# Patient Record
Sex: FEMALE | Race: OTHER | Employment: PART TIME | URBAN - METROPOLITAN AREA
[De-identification: names, ages, dates, MRNs, and addresses within clinical notes are randomized per-mention and may not be internally consistent; named-entity substitution may affect disease eponyms.]

---

## 2017-11-07 NOTE — ED PROVIDER NOTES
Patient Seen in: [facility] Emergency Department In [place]    History   Patient presents with:  Nausea/Vomiting/Diarrhea (gastrointestinal)  Abdomen/Flank Pain (GI/)    Stated Complaint: withdrawl    HPI    58-year-old female presents reporting symptom rigidity   Lungs: good air exchange and clear   Heart: regular rate rhythm and no murmur   Abdomen: Generalized tenderness on palpation. Normal bowel sounds. No abdominal masses.   No peritoneal signs   Extremities: no edema, normal peripheral pulses   Ne with methadone withdrawal.  Urine appears concentrated at the bedside. Elevated spec grav. IV fluids in progress. Will check labs.   She has had no improvement in nausea and vomiting with Zofran at home or in previous ER visit at an outside hospital.  Matilde Reyna

## 2017-11-07 NOTE — ED INITIAL ASSESSMENT (HPI)
Pt arrives to the ED due to withdrawals from methadone. Pt states she was going to a methadone clinic where she was getting a higher dose.  Since arriving here, where she is currently being treated at [facility], her dose has been decreased and she is

## 2021-03-08 ENCOUNTER — HOSPITAL ENCOUNTER (EMERGENCY)
Age: 26
Discharge: HOME | End: 2021-03-08
Attending: EMERGENCY MEDICINE
Payer: MEDICAID

## 2021-03-08 VITALS
BODY MASS INDEX: 24.11 KG/M2 | DIASTOLIC BLOOD PRESSURE: 76 MMHG | WEIGHT: 150 LBS | SYSTOLIC BLOOD PRESSURE: 97 MMHG | RESPIRATION RATE: 16 BRPM | TEMPERATURE: 98 F | HEART RATE: 62 BPM | OXYGEN SATURATION: 99 % | HEIGHT: 66 IN

## 2021-03-08 DIAGNOSIS — K04.7 DENTAL ABSCESS: Primary | ICD-10-CM

## 2021-03-08 PROCEDURE — 41800 DRAINAGE OF GUM LESION: CPT

## 2021-03-08 PROCEDURE — S0077 INJECTION, CLINDAMYCIN PHOSP: HCPCS | Performed by: EMERGENCY MEDICINE

## 2021-03-08 PROCEDURE — 96375 TX/PRO/DX INJ NEW DRUG ADDON: CPT

## 2021-03-08 PROCEDURE — 99284 EMERGENCY DEPT VISIT MOD MDM: CPT

## 2021-03-08 PROCEDURE — 96365 THER/PROPH/DIAG IV INF INIT: CPT

## 2021-03-08 RX ORDER — KETOROLAC TROMETHAMINE 15 MG/ML
15 INJECTION, SOLUTION INTRAMUSCULAR; INTRAVENOUS ONCE
Status: COMPLETED | OUTPATIENT
Start: 2021-03-08 | End: 2021-03-08

## 2021-03-08 RX ORDER — CLINDAMYCIN HYDROCHLORIDE 300 MG/1
300 CAPSULE ORAL 3 TIMES DAILY
Qty: 30 CAPSULE | Refills: 0 | Status: SHIPPED | OUTPATIENT
Start: 2021-03-08 | End: 2021-03-18

## 2021-03-08 RX ORDER — CLINDAMYCIN PHOSPHATE 600 MG/50ML
600 INJECTION INTRAVENOUS ONCE
Status: COMPLETED | OUTPATIENT
Start: 2021-03-08 | End: 2021-03-08

## 2021-03-08 NOTE — ED INITIAL ASSESSMENT (HPI)
Pt tooth pain and facial pain and swelling onset last noc. Pt denies fevers or vomiting  Sent from physicians immediate care. Swelling radiating to right side of face, painful to move eye to right.

## 2021-03-08 NOTE — ED PROVIDER NOTES
Patient Seen in: [facility] Emergency Department In [place]      History   Patient presents with:  Cellulitis    Stated Complaint: right tooth infection into face and cheek, sent by IC    HPI/Subjective:   HPI    79-year-old female presents to the [facility] well-appearing, in no acute distress. HEENT: Pupils equal round reactive to light, extraocular muscles are intact, there is no scleral icterus. No ptosis or proptosis.   There are some swelling with mild tenderness over the right maxillary area without er [id number]  [phone]    In 2 days            Medications Prescribed:  Current Discharge Medication List    START taking these medications    Clindamycin HCl 300 MG Oral Cap  Take 1 capsule (300 mg total) by mouth 3 (three) times daily for 10 days.   Qty: 30

## 2021-05-16 ENCOUNTER — HOSPITAL ENCOUNTER (EMERGENCY)
Age: 26
Discharge: HOME | End: 2021-05-16
Attending: EMERGENCY MEDICINE
Payer: MEDICAID

## 2021-05-16 VITALS
SYSTOLIC BLOOD PRESSURE: 115 MMHG | DIASTOLIC BLOOD PRESSURE: 81 MMHG | RESPIRATION RATE: 16 BRPM | HEART RATE: 76 BPM | TEMPERATURE: 98 F | OXYGEN SATURATION: 98 %

## 2021-05-16 DIAGNOSIS — J02.9 ACUTE VIRAL PHARYNGITIS: Primary | ICD-10-CM

## 2021-05-16 PROCEDURE — 99283 EMERGENCY DEPT VISIT LOW MDM: CPT

## 2021-05-16 PROCEDURE — 87430 STREP A AG IA: CPT | Performed by: EMERGENCY MEDICINE

## 2021-05-16 PROCEDURE — 87081 CULTURE SCREEN ONLY: CPT | Performed by: EMERGENCY MEDICINE

## 2021-05-16 NOTE — ED PROVIDER NOTES
Patient Seen in: THE [facility] Emergency Department In [place]      History   Patient presents with:  Sore Throat  Abdomen/Flank Pain    Stated Complaint: sore throat and abd pain x3 days    HPI/Subjective:   HPI    Patient tells me that she had a Covid expo erythema. Throat: Posterior pharynx is brightly erythematous without exudate. Uvula midline nonswollen. Tongue is nonswollen. Oromucosa is moist.  Lips are moist.  No oral lesions.   Neck: Supple  Ears: TMs are normal bilaterally  Lungs: Clear to auscul

## 2021-05-16 NOTE — ED INITIAL ASSESSMENT (HPI)
Sore throat pain with abd ache x3 days  Tested for covid 2 weeks ago - roommates recently tested positive for covid, requesting covid testing

## 2021-05-19 RX ORDER — CLINDAMYCIN HYDROCHLORIDE 300 MG/1
300 CAPSULE ORAL 3 TIMES DAILY
Qty: 30 CAPSULE | Refills: 0 | Status: SHIPPED | OUTPATIENT
Start: 2021-05-19 | End: 2021-05-29

## 2021-05-20 NOTE — ED NOTES
Unable to reach patient by phone. Will send certified letter to address listed in the EMR to inform of positive strep culture and need for ABX treatment.

## 2021-07-28 ENCOUNTER — HOSPITAL ENCOUNTER (EMERGENCY)
Age: 26
Discharge: HOME | End: 2021-07-28
Payer: MEDICAID

## 2021-08-30 ENCOUNTER — HOSPITAL ENCOUNTER (EMERGENCY)
Age: 26
Discharge: HOME | End: 2021-08-30
Attending: EMERGENCY MEDICINE
Payer: MEDICAID

## 2021-08-30 ENCOUNTER — APPOINTMENT (OUTPATIENT)
Dept: ULTRASOUND IMAGING | Age: 26
End: 2021-08-30
Attending: EMERGENCY MEDICINE
Payer: MEDICAID

## 2021-08-30 VITALS
SYSTOLIC BLOOD PRESSURE: 115 MMHG | TEMPERATURE: 101 F | WEIGHT: 155 LBS | RESPIRATION RATE: 20 BRPM | OXYGEN SATURATION: 99 % | BODY MASS INDEX: 25 KG/M2 | DIASTOLIC BLOOD PRESSURE: 64 MMHG | HEART RATE: 90 BPM

## 2021-08-30 VITALS
WEIGHT: 155 LBS | DIASTOLIC BLOOD PRESSURE: 67 MMHG | TEMPERATURE: 98 F | OXYGEN SATURATION: 98 % | RESPIRATION RATE: 14 BRPM | HEART RATE: 60 BPM | BODY MASS INDEX: 24.91 KG/M2 | SYSTOLIC BLOOD PRESSURE: 120 MMHG | HEIGHT: 66 IN

## 2021-08-30 DIAGNOSIS — O99.891 BACK PAIN AFFECTING PREGNANCY IN FIRST TRIMESTER: ICD-10-CM

## 2021-08-30 DIAGNOSIS — O26.899 ABDOMINAL PAIN AFFECTING PREGNANCY: Primary | ICD-10-CM

## 2021-08-30 DIAGNOSIS — R10.9 ABDOMINAL PAIN AFFECTING PREGNANCY: Primary | ICD-10-CM

## 2021-08-30 DIAGNOSIS — M54.9 BACK PAIN AFFECTING PREGNANCY IN FIRST TRIMESTER: ICD-10-CM

## 2021-08-30 DIAGNOSIS — B34.9 VIRAL SYNDROME: Primary | ICD-10-CM

## 2021-08-30 LAB
ALBUMIN SERPL-MCNC: 3.9 G/DL (ref 3.4–5)
ALBUMIN/GLOB SERPL: 1 {RATIO} (ref 1–2)
ALP LIVER SERPL-CCNC: 40 U/L
ALT SERPL-CCNC: 26 U/L
ANION GAP SERPL CALC-SCNC: 6 MMOL/L (ref 0–18)
AST SERPL-CCNC: 16 U/L (ref 15–37)
B-HCG SERPL-ACNC: ABNORMAL MIU/ML
BASOPHILS # BLD AUTO: 0.05 X10(3) UL (ref 0–0.2)
BASOPHILS NFR BLD AUTO: 0.3 %
BILIRUB SERPL-MCNC: 0.5 MG/DL (ref 0.1–2)
BILIRUB UR QL STRIP.AUTO: NEGATIVE
BUN BLD-MCNC: 12 MG/DL (ref 7–18)
CALCIUM BLD-MCNC: 8.5 MG/DL (ref 8.5–10.1)
CHLORIDE SERPL-SCNC: 107 MMOL/L (ref 98–112)
CLARITY UR REFRACT.AUTO: CLEAR
CO2 SERPL-SCNC: 20 MMOL/L (ref 21–32)
COLOR UR AUTO: YELLOW
CREAT BLD-MCNC: 0.77 MG/DL
EOSINOPHIL # BLD AUTO: 0.05 X10(3) UL (ref 0–0.7)
EOSINOPHIL NFR BLD AUTO: 0.3 %
ERYTHROCYTE [DISTWIDTH] IN BLOOD BY AUTOMATED COUNT: 13.9 %
GLOBULIN PLAS-MCNC: 3.9 G/DL (ref 2.8–4.4)
GLUCOSE BLD-MCNC: 92 MG/DL (ref 70–99)
GLUCOSE UR STRIP.AUTO-MCNC: NEGATIVE MG/DL
HCT VFR BLD AUTO: 35 %
HGB BLD-MCNC: 12.1 G/DL
IMM GRANULOCYTES # BLD AUTO: 0.07 X10(3) UL (ref 0–1)
IMM GRANULOCYTES NFR BLD: 0.4 %
KETONES UR STRIP.AUTO-MCNC: 15 MG/DL
LEUKOCYTE ESTERASE UR QL STRIP.AUTO: NEGATIVE
LYMPHOCYTES # BLD AUTO: 1.6 X10(3) UL (ref 1–4)
LYMPHOCYTES NFR BLD AUTO: 8.6 %
M PROTEIN MFR SERPL ELPH: 7.8 G/DL (ref 6.4–8.2)
MCH RBC QN AUTO: 30.4 PG (ref 26–34)
MCHC RBC AUTO-ENTMCNC: 34.6 G/DL (ref 31–37)
MCV RBC AUTO: 87.9 FL
MONOCYTES # BLD AUTO: 1.46 X10(3) UL (ref 0.1–1)
MONOCYTES NFR BLD AUTO: 7.8 %
NEUTROPHILS # BLD AUTO: 15.37 X10 (3) UL (ref 1.5–7.7)
NEUTROPHILS # BLD AUTO: 15.37 X10(3) UL (ref 1.5–7.7)
NEUTROPHILS NFR BLD AUTO: 82.6 %
NITRITE UR QL STRIP.AUTO: NEGATIVE
OSMOLALITY SERPL CALC.SUM OF ELEC: 275 MOSM/KG (ref 275–295)
PH UR STRIP.AUTO: 6 [PH] (ref 5–8)
PLATELET # BLD AUTO: 229 10(3)UL (ref 150–450)
POTASSIUM SERPL-SCNC: 3.7 MMOL/L (ref 3.5–5.1)
PROT UR STRIP.AUTO-MCNC: NEGATIVE MG/DL
RBC # BLD AUTO: 3.98 X10(6)UL
RH BLOOD TYPE: POSITIVE
SARS-COV-2 RNA RESP QL NAA+PROBE: NOT DETECTED
SODIUM SERPL-SCNC: 133 MMOL/L (ref 136–145)
SP GR UR STRIP.AUTO: >=1.03 (ref 1–1.03)
UROBILINOGEN UR STRIP.AUTO-MCNC: 0.2 MG/DL
WBC # BLD AUTO: 18.6 X10(3) UL (ref 4–11)

## 2021-08-30 PROCEDURE — 80053 COMPREHEN METABOLIC PANEL: CPT | Performed by: EMERGENCY MEDICINE

## 2021-08-30 PROCEDURE — 99283 EMERGENCY DEPT VISIT LOW MDM: CPT

## 2021-08-30 PROCEDURE — 86901 BLOOD TYPING SEROLOGIC RH(D): CPT | Performed by: EMERGENCY MEDICINE

## 2021-08-30 PROCEDURE — 76817 TRANSVAGINAL US OBSTETRIC: CPT | Performed by: EMERGENCY MEDICINE

## 2021-08-30 PROCEDURE — 84702 CHORIONIC GONADOTROPIN TEST: CPT | Performed by: EMERGENCY MEDICINE

## 2021-08-30 PROCEDURE — 99284 EMERGENCY DEPT VISIT MOD MDM: CPT

## 2021-08-30 PROCEDURE — 36415 COLL VENOUS BLD VENIPUNCTURE: CPT

## 2021-08-30 PROCEDURE — 85025 COMPLETE CBC W/AUTO DIFF WBC: CPT | Performed by: EMERGENCY MEDICINE

## 2021-08-30 PROCEDURE — 76801 OB US < 14 WKS SINGLE FETUS: CPT | Performed by: EMERGENCY MEDICINE

## 2021-08-30 PROCEDURE — 81003 URINALYSIS AUTO W/O SCOPE: CPT | Performed by: EMERGENCY MEDICINE

## 2021-08-30 PROCEDURE — 87430 STREP A AG IA: CPT | Performed by: EMERGENCY MEDICINE

## 2021-08-30 PROCEDURE — 86900 BLOOD TYPING SEROLOGIC ABO: CPT | Performed by: EMERGENCY MEDICINE

## 2021-08-30 RX ORDER — ACETAMINOPHEN 500 MG
1000 TABLET ORAL ONCE
Status: DISCONTINUED | OUTPATIENT
Start: 2021-08-30 | End: 2021-08-30

## 2021-08-30 RX ORDER — ACETAMINOPHEN 500 MG
1000 TABLET ORAL ONCE
Status: COMPLETED | OUTPATIENT
Start: 2021-08-30 | End: 2021-08-30

## 2021-08-30 NOTE — ED PROVIDER NOTES
Patient Seen in: THE [facility] Emergency Department In [place]      History   Patient presents with:  Pregnancy Issues    Stated Complaint: 12 weeks pregnant and c/o lower abd cramping, spotting, lower back pain, sxs on*    HPI/Subjective:   HPI    25-year-o exchange and clear   Heart: regular rate rhythm and no murmur   Abdomen: Soft and nontender. No abdominal masses. No peritoneal signs   Extremities: no edema, normal peripheral pulses  Back: Normal curvature alignment no step-offs.   No bony tenderness on perigestational hemorrhage. Probable corpus luteum cyst in the right ovary. Otherwise ovaries appear normal.  No pelvic free fluid.     This is as interpreted by Dr. Aldair Flores MD from [facility]         MDM      67-year-old female presents with back pain

## 2021-08-31 NOTE — ED PROVIDER NOTES
Patient Seen in: THE [facility] Emergency Department In [place]      History   Patient presents with:  Fatigue  Back Pain    Stated Complaint: fatigue;fever since yesterday    HPI/Subjective:   HPI    48-year-old female who is 10 weeks pregnant at this time c auscultation bilaterally  Abdomen: Soft nontender nondistended normal active bowel sounds without rebound, guarding or masses noted  Back nontender without CVA tenderness  Extremity no clubbing, cyanosis or edema noted.   Full range of motion noted without

## 2021-09-01 LAB — SARS-COV-2 RNA RESP QL NAA+PROBE: NOT DETECTED

## 2021-09-28 ENCOUNTER — HOSPITAL ENCOUNTER (EMERGENCY)
Age: 26
Discharge: HOME | End: 2021-09-28
Attending: EMERGENCY MEDICINE
Payer: MEDICAID

## 2021-09-28 VITALS
TEMPERATURE: 98 F | HEIGHT: 66 IN | BODY MASS INDEX: 23.3 KG/M2 | HEART RATE: 70 BPM | DIASTOLIC BLOOD PRESSURE: 55 MMHG | RESPIRATION RATE: 18 BRPM | OXYGEN SATURATION: 99 % | WEIGHT: 145 LBS | SYSTOLIC BLOOD PRESSURE: 119 MMHG

## 2021-09-28 DIAGNOSIS — J02.9 ACUTE VIRAL PHARYNGITIS: Primary | ICD-10-CM

## 2021-09-28 LAB — SARS-COV-2 RNA RESP QL NAA+PROBE: NOT DETECTED

## 2021-09-28 PROCEDURE — 99283 EMERGENCY DEPT VISIT LOW MDM: CPT

## 2021-09-28 PROCEDURE — 87430 STREP A AG IA: CPT | Performed by: EMERGENCY MEDICINE

## 2021-09-28 RX ORDER — PRENATAL VIT/IRON FUM/FOLIC AC 27MG-0.8MG
1 TABLET ORAL DAILY
COMMUNITY

## 2021-09-28 NOTE — ED INITIAL ASSESSMENT (HPI)
Pt has had sore throat, bodyaches chills and 14 weeks pregnant.  Pt was around possible covid positive person

## 2021-09-28 NOTE — ED PROVIDER NOTES
Patient Seen in: THE [facility] Emergency Department In [place]      History   Patient presents with:  Sore Throat  Eval-G    Stated Complaint: sore throat, cough    Subjective:   HPI    This is a 70-year-old female that presents with sore throat, body aches exudates. No cervical lymphadenopathy. Lungs: Clear to auscultation bilaterally with no rales, no retractions, and no wheezing. HEART:  Regular rate and rhythm. S1 and S2. No murmurs, no rubs or gallops. ABDOMEN: Soft, nontender and nondistended.  No

## 2021-11-05 ENCOUNTER — APPOINTMENT (OUTPATIENT)
Dept: ULTRASOUND IMAGING | Age: 26
End: 2021-11-05
Attending: PHYSICIAN ASSISTANT
Payer: MEDICAID

## 2021-11-05 ENCOUNTER — HOSPITAL ENCOUNTER (EMERGENCY)
Age: 26
Discharge: HOME | End: 2021-11-05
Attending: EMERGENCY MEDICINE
Payer: MEDICAID

## 2021-11-05 VITALS
RESPIRATION RATE: 16 BRPM | HEIGHT: 66 IN | SYSTOLIC BLOOD PRESSURE: 99 MMHG | OXYGEN SATURATION: 98 % | BODY MASS INDEX: 24.91 KG/M2 | TEMPERATURE: 98 F | DIASTOLIC BLOOD PRESSURE: 54 MMHG | HEART RATE: 78 BPM | WEIGHT: 155 LBS

## 2021-11-05 DIAGNOSIS — O20.0 THREATENED ABORTION: Primary | ICD-10-CM

## 2021-11-05 DIAGNOSIS — Z3A.19 19 WEEKS GESTATION OF PREGNANCY: ICD-10-CM

## 2021-11-05 DIAGNOSIS — O46.90 VAGINAL BLEEDING IN PREGNANCY: ICD-10-CM

## 2021-11-05 PROCEDURE — 99284 EMERGENCY DEPT VISIT MOD MDM: CPT

## 2021-11-05 PROCEDURE — 86901 BLOOD TYPING SEROLOGIC RH(D): CPT | Performed by: PHYSICIAN ASSISTANT

## 2021-11-05 PROCEDURE — 96361 HYDRATE IV INFUSION ADD-ON: CPT

## 2021-11-05 PROCEDURE — 96360 HYDRATION IV INFUSION INIT: CPT

## 2021-11-05 PROCEDURE — 99285 EMERGENCY DEPT VISIT HI MDM: CPT

## 2021-11-05 PROCEDURE — 85025 COMPLETE CBC W/AUTO DIFF WBC: CPT | Performed by: PHYSICIAN ASSISTANT

## 2021-11-05 PROCEDURE — 81003 URINALYSIS AUTO W/O SCOPE: CPT | Performed by: PHYSICIAN ASSISTANT

## 2021-11-05 PROCEDURE — 84702 CHORIONIC GONADOTROPIN TEST: CPT | Performed by: PHYSICIAN ASSISTANT

## 2021-11-05 PROCEDURE — 86900 BLOOD TYPING SEROLOGIC ABO: CPT | Performed by: PHYSICIAN ASSISTANT

## 2021-11-05 PROCEDURE — 76815 OB US LIMITED FETUS(S): CPT | Performed by: PHYSICIAN ASSISTANT

## 2021-11-05 PROCEDURE — 80053 COMPREHEN METABOLIC PANEL: CPT | Performed by: PHYSICIAN ASSISTANT

## 2021-11-05 NOTE — ED INITIAL ASSESSMENT (HPI)
PT APPROX 19 WKS PREG AND NOTICED SPOTTING WITH WIPING TWICE TODAY-- NO ACTIVE BLEEDING- SOME CRAMPING--HAS FELT MOVEMENT TODAY

## 2021-11-05 NOTE — ED PROVIDER NOTES
Patient Seen in: THE [facility] Emergency Department In [place]      History   Patient presents with:  Pregnancy Issues    Stated Complaint: 19 weeks preg some blood spotted while wiping     Subjective:   HPI    26-year-old female who is approximately 19 week Ear: External ear normal.      Left Ear: External ear normal.      Mouth/Throat:      Pharynx: No oropharyngeal exudate. Eyes:      Conjunctiva/sclera: Conjunctivae normal.      Pupils: Pupils are equal, round, and reactive to light.    Cardiovascular: within normal limits   CBC WITH DIFFERENTIAL WITH PLATELET    Narrative: The following orders were created for panel order CBC With Differential With Platelet.   Procedure                               Abnormality         Status                     ---- Julianna who agrees with assessment and plan.     25-year-old female who is approximate 19 weeks gestation, patient ultrasound shows baby that correlates to this due date, no evidence of placental abruption no evidence of any acute abnormalities, no signs of

## 2021-11-25 ENCOUNTER — HOSPITAL ENCOUNTER (EMERGENCY)
Age: 26
Discharge: HOME | End: 2021-11-25
Attending: EMERGENCY MEDICINE
Payer: MEDICAID

## 2021-11-25 VITALS
BODY MASS INDEX: 30 KG/M2 | WEIGHT: 185.19 LBS | HEART RATE: 64 BPM | RESPIRATION RATE: 16 BRPM | OXYGEN SATURATION: 100 % | TEMPERATURE: 98 F | DIASTOLIC BLOOD PRESSURE: 69 MMHG | SYSTOLIC BLOOD PRESSURE: 97 MMHG

## 2021-11-25 DIAGNOSIS — Z13.9 ENCOUNTER FOR MEDICAL SCREENING EXAMINATION: Primary | ICD-10-CM

## 2021-11-25 PROCEDURE — 99283 EMERGENCY DEPT VISIT LOW MDM: CPT

## 2021-11-26 NOTE — ED QUICK NOTES
This RN called and spoke with Hilda montes RN, aware that pt is here and will be discharged home. Pt has no complaints at this time.

## 2021-11-26 NOTE — ED INITIAL ASSESSMENT (HPI)
Pt sent here for Covid testing from his work. Pt states that she has a cough for three days.  Denies any fevers

## 2021-11-26 NOTE — ED PROVIDER NOTES
Patient Seen in: THE [facility] Emergency Department In [place]      History   Patient presents with:  Covid    Stated Complaint: COUGH (COVID)    Subjective:   HPI    50-year-old female with a history of pregnancy presents to the emergency department for [facility] determined, within reasonable clinical confidence and prior to discharge, that an emergency medical condition was not or was no longer present. There was no indication for further evaluation, treatment or admission on an emergency basis.   Comprehensive ve

## 2021-12-22 LAB — HIV RESULT OB: NEGATIVE

## 2022-02-06 ENCOUNTER — APPOINTMENT (OUTPATIENT)
Dept: ULTRASOUND IMAGING | Facility: HOSPITAL | Age: 27
End: 2022-02-06
Attending: OBSTETRICS & GYNECOLOGY
Payer: MEDICAID

## 2022-02-06 ENCOUNTER — HOSPITAL ENCOUNTER (OUTPATIENT)
Facility: HOSPITAL | Age: 27
Discharge: HOME | End: 2022-02-06
Attending: OBSTETRICS & GYNECOLOGY | Admitting: OBSTETRICS & GYNECOLOGY
Payer: MEDICAID

## 2022-02-06 VITALS
HEIGHT: 66 IN | TEMPERATURE: 98 F | HEART RATE: 80 BPM | SYSTOLIC BLOOD PRESSURE: 118 MMHG | RESPIRATION RATE: 18 BRPM | DIASTOLIC BLOOD PRESSURE: 67 MMHG | WEIGHT: 188 LBS | BODY MASS INDEX: 30.22 KG/M2

## 2022-02-06 LAB
BASOPHILS # BLD AUTO: 0.03 X10(3) UL (ref 0–0.2)
BASOPHILS NFR BLD AUTO: 0.2 %
EOSINOPHIL # BLD AUTO: 0.07 X10(3) UL (ref 0–0.7)
EOSINOPHIL NFR BLD AUTO: 0.5 %
ERYTHROCYTE [DISTWIDTH] IN BLOOD BY AUTOMATED COUNT: 12.4 %
HCT VFR BLD AUTO: 34.9 %
HGB BLD-MCNC: 11.8 G/DL
IMM GRANULOCYTES # BLD AUTO: 0.07 X10(3) UL (ref 0–1)
KLEIHAUER BETKE RESULT: NEGATIVE
LYMPHOCYTES # BLD AUTO: 1.42 X10(3) UL (ref 1–4)
LYMPHOCYTES NFR BLD AUTO: 10.9 %
MCHC RBC AUTO-ENTMCNC: 33.8 G/DL (ref 31–37)
MCV RBC AUTO: 89.5 FL
MONOCYTES # BLD AUTO: 0.82 X10(3) UL (ref 0.1–1)
MONOCYTES NFR BLD AUTO: 6.3 %
NEUTROPHILS # BLD AUTO: 10.6 X10 (3) UL (ref 1.5–7.7)
NEUTROPHILS # BLD AUTO: 10.6 X10(3) UL (ref 1.5–7.7)
NEUTROPHILS NFR BLD AUTO: 81.6 %
PLATELET # BLD AUTO: 280 10(3)UL (ref 150–450)
RBC # BLD AUTO: 3.9 X10(6)UL
WBC # BLD AUTO: 13 X10(3) UL (ref 4–11)

## 2022-02-06 PROCEDURE — 85025 COMPLETE CBC W/AUTO DIFF WBC: CPT | Performed by: OBSTETRICS & GYNECOLOGY

## 2022-02-06 PROCEDURE — 36415 COLL VENOUS BLD VENIPUNCTURE: CPT

## 2022-02-06 PROCEDURE — 76815 OB US LIMITED FETUS(S): CPT | Performed by: OBSTETRICS & GYNECOLOGY

## 2022-02-06 PROCEDURE — 59025 FETAL NON-STRESS TEST: CPT

## 2022-02-06 PROCEDURE — 85460 HEMOGLOBIN FETAL: CPT | Performed by: OBSTETRICS & GYNECOLOGY

## 2022-02-06 PROCEDURE — 99213 OFFICE O/P EST LOW 20 MIN: CPT

## 2022-02-06 NOTE — PROGRESS NOTES
Pt is a  in triage for a fall that occurred at [address] [facility]. Pt is 33 weeks by most recent ultrasound with due date 2022. Pt previous due date  from earlier ultrasound done at [facility], putting her at 37.5 weeks. Pt receives prenatal care from [facility] in the [facility]. Pt states she was showering at 0345 this morning and fell onto the left side of her abdomen. Pt states she then felt sharp intermittent radiating pain along the left side of abdomen. Pt denies any contractions/cramping/vaginal bleeding. efm tested and applied. FOB at bedside.

## 2022-02-06 NOTE — NST
Nonstress Test   Patient: Mary Ortiz    Gestation: 37w5d    NST:                   Accelerations: Yes           Decelerations: None            Baseline: 145 BPM           Uterine Irritability: No           Contractions: Not present                                        Acoustic Stimulator: No           Nonstress Test Interpretation: Reactive           Nonstress Test Second Interpretation: Reactive                     Additional Comments  agree with interpretation.

## 2022-06-15 ENCOUNTER — APPOINTMENT (OUTPATIENT)
Dept: GENERAL RADIOLOGY | Facility: HOSPITAL | Age: 27
End: 2022-06-15
Attending: EMERGENCY MEDICINE
Payer: MEDICAID

## 2022-06-15 ENCOUNTER — HOSPITAL ENCOUNTER (EMERGENCY)
Facility: HOSPITAL | Age: 27
Discharge: HOME | End: 2022-06-15
Attending: EMERGENCY MEDICINE
Payer: MEDICAID

## 2022-06-15 VITALS
BODY MASS INDEX: 24.91 KG/M2 | DIASTOLIC BLOOD PRESSURE: 86 MMHG | WEIGHT: 155 LBS | SYSTOLIC BLOOD PRESSURE: 124 MMHG | TEMPERATURE: 98 F | HEART RATE: 51 BPM | RESPIRATION RATE: 18 BRPM | HEIGHT: 66 IN | OXYGEN SATURATION: 99 %

## 2022-06-15 DIAGNOSIS — K05.00 GINGIVITIS, ACUTE: ICD-10-CM

## 2022-06-15 DIAGNOSIS — K04.7 DENTAL ABSCESS: Primary | ICD-10-CM

## 2022-06-15 DIAGNOSIS — K02.9 DENTAL CARIES: ICD-10-CM

## 2022-06-15 PROCEDURE — 99283 EMERGENCY DEPT VISIT LOW MDM: CPT

## 2022-06-15 PROCEDURE — 70360 X-RAY EXAM OF NECK: CPT | Performed by: EMERGENCY MEDICINE

## 2022-06-15 RX ORDER — TRAMADOL HYDROCHLORIDE 50 MG/1
TABLET ORAL EVERY 6 HOURS PRN
Qty: 10 TABLET | Refills: 0 | Status: SHIPPED | OUTPATIENT
Start: 2022-06-15 | End: 2022-06-20

## 2022-06-15 RX ORDER — ONDANSETRON 4 MG/1
4 TABLET, ORALLY DISINTEGRATING ORAL ONCE
Status: COMPLETED | OUTPATIENT
Start: 2022-06-15 | End: 2022-06-15

## 2022-06-15 RX ORDER — ONDANSETRON 4 MG/1
4 TABLET, ORALLY DISINTEGRATING ORAL EVERY 4 HOURS PRN
Qty: 10 TABLET | Refills: 0 | Status: SHIPPED | OUTPATIENT
Start: 2022-06-15

## 2022-06-15 RX ORDER — CLINDAMYCIN HYDROCHLORIDE 150 MG/1
300 CAPSULE ORAL ONCE
Status: COMPLETED | OUTPATIENT
Start: 2022-06-15 | End: 2022-06-15

## 2022-06-15 RX ORDER — IBUPROFEN 600 MG/1
600 TABLET ORAL ONCE
Status: COMPLETED | OUTPATIENT
Start: 2022-06-15 | End: 2022-06-15

## 2022-06-15 RX ORDER — CLINDAMYCIN HYDROCHLORIDE 300 MG/1
300 CAPSULE ORAL 3 TIMES DAILY
Qty: 30 CAPSULE | Refills: 0 | Status: SHIPPED | OUTPATIENT
Start: 2022-06-15 | End: 2022-06-25

## 2022-06-15 RX ORDER — NAPROXEN 500 MG/1
500 TABLET ORAL 2 TIMES DAILY PRN
Qty: 20 TABLET | Refills: 0 | Status: SHIPPED | OUTPATIENT
Start: 2022-06-15

## 2022-06-15 RX ORDER — ONDANSETRON 4 MG/1
TABLET, ORALLY DISINTEGRATING ORAL
Status: COMPLETED
Start: 2022-06-15 | End: 2022-06-15

## 2022-06-15 NOTE — ED INITIAL ASSESSMENT (HPI)
Pt presents to ed ambulatory with steady gait c/o dental pain, facial pain and swelling due to and infected tooth.  Pt states pain started 2 days ago

## 2022-12-07 ENCOUNTER — HOSPITAL ENCOUNTER (OUTPATIENT)
Age: 27
Discharge: HOME | End: 2022-12-07
Payer: MEDICAID

## 2022-12-07 VITALS
RESPIRATION RATE: 18 BRPM | DIASTOLIC BLOOD PRESSURE: 90 MMHG | OXYGEN SATURATION: 99 % | SYSTOLIC BLOOD PRESSURE: 115 MMHG | HEART RATE: 63 BPM | TEMPERATURE: 99 F

## 2022-12-07 DIAGNOSIS — H10.33 ACUTE CONJUNCTIVITIS OF BOTH EYES, UNSPECIFIED ACUTE CONJUNCTIVITIS TYPE: Primary | ICD-10-CM

## 2022-12-07 DIAGNOSIS — R21 RASH AND NONSPECIFIC SKIN ERUPTION: ICD-10-CM

## 2022-12-07 LAB — SARS-COV-2 RNA RESP QL NAA+PROBE: NOT DETECTED

## 2022-12-07 PROCEDURE — 99203 OFFICE O/P NEW LOW 30 MIN: CPT | Performed by: NURSE PRACTITIONER

## 2022-12-07 PROCEDURE — U0002 COVID-19 LAB TEST NON-CDC: HCPCS | Performed by: NURSE PRACTITIONER

## 2022-12-07 RX ORDER — POLYMYXIN B SULFATE AND TRIMETHOPRIM 1; 10000 MG/ML; [USP'U]/ML
1 SOLUTION OPHTHALMIC
Qty: 1 EACH | Refills: 0 | Status: SHIPPED | OUTPATIENT
Start: 2022-12-07 | End: 2022-12-12

## 2022-12-07 RX ORDER — CLOTRIMAZOLE AND BETAMETHASONE DIPROPIONATE 10; .64 MG/G; MG/G
1 CREAM TOPICAL 2 TIMES DAILY
Qty: 1 EACH | Refills: 0 | Status: SHIPPED | OUTPATIENT
Start: 2022-12-07 | End: 2022-12-21

## 2022-12-07 NOTE — ED INITIAL ASSESSMENT (HPI)
Pt c/o rash under her left underarm. Pt states the rash for 4 days. Pt also c/o swelling , itchy and draining from both eyes. Pt c/o fatigue, headache and body aches.

## 2022-12-07 NOTE — DISCHARGE INSTRUCTIONS
Please use eyedrops as prescribed. Use cream to the left armpit. Follow-up with dermatology and primary.

## 2023-08-08 ENCOUNTER — TELEPHONE (OUTPATIENT)
Facility: CLINIC | Age: 28
End: 2023-08-08

## 2023-08-08 DIAGNOSIS — O36.80X0 PREGNANCY WITH INCONCLUSIVE FETAL VIABILITY, SINGLE OR UNSPECIFIED FETUS: Primary | ICD-10-CM

## 2023-08-11 ENCOUNTER — ULTRASOUND ENCOUNTER (OUTPATIENT)
Facility: CLINIC | Age: 28
End: 2023-08-11
Payer: MEDICAID

## 2023-08-11 ENCOUNTER — INITIAL PRENATAL (OUTPATIENT)
Facility: CLINIC | Age: 28
End: 2023-08-11
Payer: MEDICAID

## 2023-08-11 VITALS
SYSTOLIC BLOOD PRESSURE: 102 MMHG | WEIGHT: 168 LBS | DIASTOLIC BLOOD PRESSURE: 68 MMHG | BODY MASS INDEX: 27 KG/M2 | HEIGHT: 66 IN | HEART RATE: 68 BPM

## 2023-08-11 DIAGNOSIS — Z87.59 HISTORY OF PREGNANCY WITH ABNORMAL GLUCOSE TOLERANCE TEST (GTT): ICD-10-CM

## 2023-08-11 DIAGNOSIS — Z36.89 ENCOUNTER FOR FETAL ANATOMIC SURVEY: ICD-10-CM

## 2023-08-11 DIAGNOSIS — B19.20 HEPATITIS C TEST POSITIVE: ICD-10-CM

## 2023-08-11 DIAGNOSIS — Z34.82 PRENATAL CARE, SUBSEQUENT PREGNANCY, SECOND TRIMESTER: ICD-10-CM

## 2023-08-11 DIAGNOSIS — Z11.3 SCREENING EXAMINATION FOR STD (SEXUALLY TRANSMITTED DISEASE): ICD-10-CM

## 2023-08-11 DIAGNOSIS — R51.9 HEADACHE IN PREGNANCY, SECOND TRIMESTER: Primary | ICD-10-CM

## 2023-08-11 DIAGNOSIS — O36.80X0 PREGNANCY WITH INCONCLUSIVE FETAL VIABILITY, SINGLE OR UNSPECIFIED FETUS: ICD-10-CM

## 2023-08-11 DIAGNOSIS — O26.892 HEADACHE IN PREGNANCY, SECOND TRIMESTER: Primary | ICD-10-CM

## 2023-08-11 DIAGNOSIS — Z36.0 ENCOUNTER FOR ANTENATAL SCREENING FOR CHROMOSOMAL ANOMALIES: ICD-10-CM

## 2023-08-11 DIAGNOSIS — N92.6 IRREGULAR MENSES: ICD-10-CM

## 2023-08-11 DIAGNOSIS — Z86.19 HISTORY OF HEPATITIS C: ICD-10-CM

## 2023-08-11 DIAGNOSIS — Z13.71 SCREENING FOR GENETIC DISEASE CARRIER STATUS: ICD-10-CM

## 2023-08-11 DIAGNOSIS — F19.91 HISTORY OF INTRAVENOUS DRUG USE IN REMISSION: ICD-10-CM

## 2023-08-11 PROBLEM — F19.921 ACUTE DRUG INTOXICATION WITH DELIRIUM (HCC): Status: ACTIVE | Noted: 2020-06-12

## 2023-08-11 PROBLEM — Z87.898 HISTORY OF INTRAVENOUS DRUG USE IN REMISSION: Status: ACTIVE | Noted: 2023-08-11

## 2023-08-11 PROBLEM — F19.921 ACUTE DRUG INTOXICATION WITH DELIRIUM (HCC): Status: RESOLVED | Noted: 2020-06-12 | Resolved: 2023-08-11

## 2023-08-11 PROCEDURE — 3074F SYST BP LT 130 MM HG: CPT | Performed by: OBSTETRICS & GYNECOLOGY

## 2023-08-11 PROCEDURE — 76801 OB US < 14 WKS SINGLE FETUS: CPT | Performed by: OBSTETRICS & GYNECOLOGY

## 2023-08-11 PROCEDURE — 3008F BODY MASS INDEX DOCD: CPT | Performed by: OBSTETRICS & GYNECOLOGY

## 2023-08-11 PROCEDURE — 87491 CHLMYD TRACH DNA AMP PROBE: CPT | Performed by: OBSTETRICS & GYNECOLOGY

## 2023-08-11 PROCEDURE — 3078F DIAST BP <80 MM HG: CPT | Performed by: OBSTETRICS & GYNECOLOGY

## 2023-08-11 PROCEDURE — 87086 URINE CULTURE/COLONY COUNT: CPT | Performed by: OBSTETRICS & GYNECOLOGY

## 2023-08-11 PROCEDURE — 87591 N.GONORRHOEAE DNA AMP PROB: CPT | Performed by: OBSTETRICS & GYNECOLOGY

## 2023-08-11 PROCEDURE — 0500F INITIAL PRENATAL CARE VISIT: CPT | Performed by: OBSTETRICS & GYNECOLOGY

## 2023-08-11 NOTE — PATIENT INSTRUCTIONS
Congratulations! Your Due Date is: Estimated Date of Delivery: 1/31/24     As a pregnant patient, if you are having a medical problem please CALL the office [phone] (do not \"MyEnergy message\") as we want to address your concerns immediately due to the pregnancy. We share call with another Ce Fre[facility] ([facility]) of physicians - Rick Briggs, Anastasia HAYES, Kathy Montero. We cannot guarantee that you will not see a male provider. Magnesium supplementation  Magnesium glycinate 250-500 mg nightly  Glycinate is harder to find, more expensive, but better absorbed, may have less GI side effects    Alternatives: magnesium chloride, magnesium sulfate. Do NOT recommend magnesium citrate - this is a laxative. Do not recommend taking at same time as prenatal vitamin (calcium in prenatal vitamin competes with magnesium for absorption)    *If you cannot tolerate oral route:  Epsom salt soaks (baths or foot baths - you can absorb magnesium through the skin)   Magnesium lotions/body sprays. Prenatal labs  -sooner is better   -these labs unfortunately CANNOT be done in our office at this time  -please go to your preferred lab [facility], [facility], etc)    Prenatal vitamin   -make sure it CONTAINS IRON. The gummy vitamins frequently DO NOT CONTAIN IRON. Genetic screening  2 types to consider - both OPTIONAL:   1) Screening of the fetus for chromosomal disorders:   -Multiple ways to screen for this.   -None of these tests are 100% accurate. If an abnormal result is obtained, further testing is advised.    -Most popular are:       () cell free DNA (also called \"NIPS\" or \"non-invasive prenatal screening\")   -blood draw only   -looks for fragments of placental DNA in maternal bloodstream   -placental DNA does NOT always match fetal DNA  -timing: must be at least 10w0d to be drawn   -where: done in our office (or with high risk OB/MFM)   -cost: genetics company checks insurance benefits & calls you with cost estimate prior to running the test(s)          () nuchal translucency (NT) ultrasound   -measures thickness of fetal neck skin fold (looking for abnormal swelling)   -timinw0d - 13w6d   -where: high risk OB/MFM office. Cannot be done at our office. 2) Screening of the mother   -done to see if she is a carrier of a mutation that causes a disease, that she could pass along to the fetus. If she is positive for a mutation, generally it is recommended to screen the father of the fetus to see if he is a carrier for the same mutation to determine risk of the fetus having the disease caused by the mutation. -CAN BE DONE ANYTIME, ideally even prior to pregnancy     Low dose aspirin  -recommended if 1 or more risk factors for preeclampsia  -seems to help reduce risk of preeclampsia  -recommended time to start is 11-12 weeks  -current [facility] recommendation is 1.5 tablets of the aspirin 81 mg tab. If you cannot split the tablet you can take 2. AFP level   There is an optional blood test that we can perform on you called \"AFP level. \" It is a chemical in the bloodstream produced by the pregnancy. It is done between 15-20 weeks gestation. The ideal time for testing is actually 16-18 weeks gestation. If the level is abnormally elevated, this can indicate the presence of abnormalities of the development of the brain and spinal cord such as anencephaly (lack of brain development) and spina bifida (exposed spinal cord). These anomalies can generally be seen on ultrasound. It can also be elevated in cases of normal fetal anatomy and can indicate an abnormal placenta. This may or may not be able to be detected on ultrasound. Please think about whether or not you would like to have this test done. When you decide when you would like to have this test done, please let us know.  We must enter your exact gestational age and weight on the date of the blood draw for the test to be calculated accurately.       Fetal anatomy scan (\"20 week ultrasound\")  -can be done in our office (schedule with ) if no particular high risk feature or indication  -recommend having done with MFM (Maternal Fetal Medicine aka \"High Risk OB\") if higher risk e.g family history of birth defects, chronic hypertension, diabetes, advanced maternal age, etc.     Prenatal classes   [email or web address]

## 2023-08-13 LAB
C TRACH DNA SPEC QL NAA+PROBE: NEGATIVE
N GONORRHOEA DNA SPEC QL NAA+PROBE: NEGATIVE

## 2023-08-14 ENCOUNTER — TELEPHONE (OUTPATIENT)
Facility: CLINIC | Age: 28
End: 2023-08-14

## 2023-08-14 ENCOUNTER — NURSE ONLY (OUTPATIENT)
Facility: CLINIC | Age: 28
End: 2023-08-14
Payer: MEDICAID

## 2023-08-14 VITALS
SYSTOLIC BLOOD PRESSURE: 102 MMHG | BODY MASS INDEX: 26.94 KG/M2 | WEIGHT: 167.63 LBS | HEART RATE: 77 BPM | HEIGHT: 66 IN | DIASTOLIC BLOOD PRESSURE: 62 MMHG

## 2023-08-14 NOTE — PROGRESS NOTES
15   Pt request for NIPS lab draw per Dr. Charanjit Joiner      Patients name &  verified on lab tubes with patient. NIPSscreen lab drawn, patient tolerated well. Specimen placed in  office. [employer] access, call in 2 weeks for result, Cautioned patient may receive results via email from [facility] that includes gender results discussed. Patient verbalized understanding.

## 2023-08-18 LAB
AMB EXT MYRIAD TRISOMY 13: NEGATIVE
AMB EXT MYRIAD TRISOMY 18: NEGATIVE
AMB EXT MYRIAD TRISOMY 21: NEGATIVE

## 2023-08-21 ENCOUNTER — TELEPHONE (OUTPATIENT)
Facility: CLINIC | Age: 28
End: 2023-08-21

## 2023-08-21 NOTE — TELEPHONE ENCOUNTER
Pt called with questions regarding her [facility] results. Stated she received an email saying they were ready and wanted to know if they had been reviewed.  Please advise and call when able

## 2023-08-21 NOTE — TELEPHONE ENCOUNTER
Spoke with pt. Aware NIPT is negative & predicted gender is male. Results released to pt. Verbalized understanding.

## 2023-08-21 NOTE — TELEPHONE ENCOUNTER
16 5/7 had question about hair dye. [employer] sent to pt via Tele[employer]. Patient verbalized understanding, agreed to and intend to comply with plan of care.

## 2023-09-14 ENCOUNTER — ROUTINE PRENATAL (OUTPATIENT)
Facility: CLINIC | Age: 28
End: 2023-09-14
Payer: MEDICAID

## 2023-09-14 VITALS
WEIGHT: 172.81 LBS | HEIGHT: 66 IN | SYSTOLIC BLOOD PRESSURE: 100 MMHG | BODY MASS INDEX: 27.77 KG/M2 | HEART RATE: 64 BPM | DIASTOLIC BLOOD PRESSURE: 66 MMHG

## 2023-09-14 DIAGNOSIS — Z86.19 HISTORY OF HEPATITIS C: Primary | ICD-10-CM

## 2023-09-14 DIAGNOSIS — Z34.02 ENCOUNTER FOR PRENATAL CARE IN SECOND TRIMESTER OF FIRST PREGNANCY: ICD-10-CM

## 2023-09-14 PROCEDURE — 0502F SUBSEQUENT PRENATAL CARE: CPT

## 2023-09-14 PROCEDURE — 3078F DIAST BP <80 MM HG: CPT

## 2023-09-14 PROCEDURE — 3074F SYST BP LT 130 MM HG: CPT

## 2023-09-14 PROCEDURE — 3008F BODY MASS INDEX DOCD: CPT

## 2023-09-19 ENCOUNTER — ULTRASOUND ENCOUNTER (OUTPATIENT)
Facility: CLINIC | Age: 28
End: 2023-09-19
Payer: MEDICAID

## 2023-09-20 PROBLEM — O35.BXX0 FETAL CARDIAC ANOMALY COMPLICATING PREGNANCY, ANTEPARTUM (HCC): Status: ACTIVE | Noted: 2023-09-20

## 2023-09-20 PROBLEM — O35.BXX0 FETAL CARDIAC ANOMALY COMPLICATING PREGNANCY, ANTEPARTUM: Status: ACTIVE | Noted: 2023-09-20

## 2023-10-03 ENCOUNTER — OFFICE VISIT (OUTPATIENT)
Dept: PERINATAL CARE | Facility: HOSPITAL | Age: 28
End: 2023-10-03
Attending: OBSTETRICS & GYNECOLOGY
Payer: MEDICAID

## 2023-10-03 VITALS
HEIGHT: 66 IN | BODY MASS INDEX: 28.61 KG/M2 | WEIGHT: 178 LBS | HEART RATE: 68 BPM | DIASTOLIC BLOOD PRESSURE: 65 MMHG | SYSTOLIC BLOOD PRESSURE: 104 MMHG

## 2023-10-03 DIAGNOSIS — O35.BXX0 FETAL CARDIAC ANOMALY COMPLICATING PREGNANCY, ANTEPARTUM: ICD-10-CM

## 2023-10-03 DIAGNOSIS — Z03.73 SUSPECTED FETAL ANOMALY NOT FOUND: ICD-10-CM

## 2023-10-03 DIAGNOSIS — Z34.82 PRENATAL CARE, SUBSEQUENT PREGNANCY, SECOND TRIMESTER: ICD-10-CM

## 2023-10-03 DIAGNOSIS — B19.20 HEPATITIS C TEST POSITIVE: ICD-10-CM

## 2023-10-03 DIAGNOSIS — F19.91 HISTORY OF INTRAVENOUS DRUG USE IN REMISSION: ICD-10-CM

## 2023-10-03 DIAGNOSIS — F19.91 HISTORY OF INTRAVENOUS DRUG USE IN REMISSION: Primary | ICD-10-CM

## 2023-10-03 PROCEDURE — 76811 OB US DETAILED SNGL FETUS: CPT | Performed by: OBSTETRICS & GYNECOLOGY

## 2023-10-10 ENCOUNTER — TELEPHONE (OUTPATIENT)
Facility: CLINIC | Age: 28
End: 2023-10-10

## 2023-10-10 ENCOUNTER — ROUTINE PRENATAL (OUTPATIENT)
Facility: CLINIC | Age: 28
End: 2023-10-10
Payer: MEDICAID

## 2023-10-10 ENCOUNTER — LAB ENCOUNTER (OUTPATIENT)
Dept: LAB | Age: 28
End: 2023-10-10
Attending: OBSTETRICS & GYNECOLOGY
Payer: MEDICAID

## 2023-10-10 VITALS
WEIGHT: 180 LBS | SYSTOLIC BLOOD PRESSURE: 110 MMHG | BODY MASS INDEX: 28.93 KG/M2 | DIASTOLIC BLOOD PRESSURE: 74 MMHG | HEIGHT: 66 IN | HEART RATE: 80 BPM

## 2023-10-10 DIAGNOSIS — Z13.1 DIABETES MELLITUS SCREENING: ICD-10-CM

## 2023-10-10 DIAGNOSIS — Z34.83 ENCOUNTER FOR SUPERVISION OF OTHER NORMAL PREGNANCY IN THIRD TRIMESTER: Primary | ICD-10-CM

## 2023-10-10 DIAGNOSIS — Z87.59 HISTORY OF PREGNANCY WITH ABNORMAL GLUCOSE TOLERANCE TEST (GTT): ICD-10-CM

## 2023-10-10 DIAGNOSIS — Z11.4 ENCOUNTER FOR SCREENING FOR HIV: Primary | ICD-10-CM

## 2023-10-10 DIAGNOSIS — Z13.0 SCREENING FOR DEFICIENCY ANEMIA: ICD-10-CM

## 2023-10-10 DIAGNOSIS — Z86.19 HISTORY OF HEPATITIS C: ICD-10-CM

## 2023-10-10 DIAGNOSIS — Z34.82 PRENATAL CARE, SUBSEQUENT PREGNANCY, SECOND TRIMESTER: ICD-10-CM

## 2023-10-10 LAB
ALBUMIN SERPL-MCNC: 3 G/DL (ref 3.4–5)
ALBUMIN/GLOB SERPL: 0.6 {RATIO} (ref 1–2)
ALP LIVER SERPL-CCNC: 52 U/L
ALT SERPL-CCNC: 15 U/L
ANION GAP SERPL CALC-SCNC: 5 MMOL/L (ref 0–18)
ANTIBODY SCREEN: NEGATIVE
AST SERPL-CCNC: 7 U/L (ref 15–37)
BASOPHILS # BLD AUTO: 0.03 X10(3) UL (ref 0–0.2)
BASOPHILS NFR BLD AUTO: 0.3 %
BILIRUB SERPL-MCNC: 0.3 MG/DL (ref 0.1–2)
BUN BLD-MCNC: 8 MG/DL (ref 7–18)
CALCIUM BLD-MCNC: 8.9 MG/DL (ref 8.5–10.1)
CHLORIDE SERPL-SCNC: 107 MMOL/L (ref 98–112)
CO2 SERPL-SCNC: 23 MMOL/L (ref 21–32)
CREAT BLD-MCNC: 0.63 MG/DL
EGFRCR SERPLBLD CKD-EPI 2021: 123 ML/MIN/1.73M2 (ref 60–?)
EOSINOPHIL # BLD AUTO: 0.06 X10(3) UL (ref 0–0.7)
EOSINOPHIL NFR BLD AUTO: 0.5 %
ERYTHROCYTE [DISTWIDTH] IN BLOOD BY AUTOMATED COUNT: 12.3 %
EST. AVERAGE GLUCOSE BLD GHB EST-MCNC: 100 MG/DL (ref 68–126)
FASTING STATUS PATIENT QL REPORTED: NO
GLOBULIN PLAS-MCNC: 4.7 G/DL (ref 2.8–4.4)
GLUCOSE BLD-MCNC: 84 MG/DL (ref 70–99)
HBA1C MFR BLD: 5.1 % (ref ?–5.7)
HBV SURFACE AG SER-ACNC: <0.1 [IU]/L
HBV SURFACE AG SERPL QL IA: NONREACTIVE
HCT VFR BLD AUTO: 37.1 %
HGB BLD-MCNC: 12.9 G/DL
IMM GRANULOCYTES # BLD AUTO: 0.08 X10(3) UL (ref 0–1)
IMM GRANULOCYTES NFR BLD: 0.7 %
LYMPHOCYTES # BLD AUTO: 1.65 X10(3) UL (ref 1–4)
LYMPHOCYTES NFR BLD AUTO: 14.3 %
MCH RBC QN AUTO: 31.7 PG (ref 26–34)
MCHC RBC AUTO-ENTMCNC: 34.8 G/DL (ref 31–37)
MCV RBC AUTO: 91.2 FL
MONOCYTES # BLD AUTO: 0.79 X10(3) UL (ref 0.1–1)
MONOCYTES NFR BLD AUTO: 6.9 %
NEUTROPHILS # BLD AUTO: 8.91 X10 (3) UL (ref 1.5–7.7)
NEUTROPHILS # BLD AUTO: 8.91 X10(3) UL (ref 1.5–7.7)
NEUTROPHILS NFR BLD AUTO: 77.3 %
OSMOLALITY SERPL CALC.SUM OF ELEC: 278 MOSM/KG (ref 275–295)
PLATELET # BLD AUTO: 249 10(3)UL (ref 150–450)
POTASSIUM SERPL-SCNC: 3.9 MMOL/L (ref 3.5–5.1)
PROT SERPL-MCNC: 7.7 G/DL (ref 6.4–8.2)
RBC # BLD AUTO: 4.07 X10(6)UL
RH BLOOD TYPE: POSITIVE
RUBV IGG SER QL: NEGATIVE
RUBV IGG SER-ACNC: 1.5 IU/ML (ref 10–?)
SODIUM SERPL-SCNC: 135 MMOL/L (ref 136–145)
T PALLIDUM AB SER QL IA: NONREACTIVE
WBC # BLD AUTO: 11.5 X10(3) UL (ref 4–11)

## 2023-10-10 PROCEDURE — 82977 ASSAY OF GGT: CPT

## 2023-10-10 PROCEDURE — 0502F SUBSEQUENT PRENATAL CARE: CPT

## 2023-10-10 PROCEDURE — 80053 COMPREHEN METABOLIC PANEL: CPT

## 2023-10-10 PROCEDURE — 36415 COLL VENOUS BLD VENIPUNCTURE: CPT

## 2023-10-10 PROCEDURE — 84460 ALANINE AMINO (ALT) (SGPT): CPT

## 2023-10-10 PROCEDURE — 86850 RBC ANTIBODY SCREEN: CPT

## 2023-10-10 PROCEDURE — 86901 BLOOD TYPING SEROLOGIC RH(D): CPT

## 2023-10-10 PROCEDURE — 87522 HEPATITIS C REVRS TRNSCRPJ: CPT

## 2023-10-10 PROCEDURE — 87340 HEPATITIS B SURFACE AG IA: CPT

## 2023-10-10 PROCEDURE — 99214 OFFICE O/P EST MOD 30 MIN: CPT

## 2023-10-10 PROCEDURE — 86762 RUBELLA ANTIBODY: CPT

## 2023-10-10 PROCEDURE — 84520 ASSAY OF UREA NITROGEN: CPT

## 2023-10-10 PROCEDURE — 3078F DIAST BP <80 MM HG: CPT

## 2023-10-10 PROCEDURE — 84450 TRANSFERASE (AST) (SGOT): CPT

## 2023-10-10 PROCEDURE — 86900 BLOOD TYPING SEROLOGIC ABO: CPT

## 2023-10-10 PROCEDURE — 86780 TREPONEMA PALLIDUM: CPT

## 2023-10-10 PROCEDURE — 3074F SYST BP LT 130 MM HG: CPT

## 2023-10-10 PROCEDURE — 87389 HIV-1 AG W/HIV-1&-2 AB AG IA: CPT

## 2023-10-10 PROCEDURE — 83036 HEMOGLOBIN GLYCOSYLATED A1C: CPT

## 2023-10-10 PROCEDURE — 85025 COMPLETE CBC W/AUTO DIFF WBC: CPT

## 2023-10-10 PROCEDURE — 83883 ASSAY NEPHELOMETRY NOT SPEC: CPT

## 2023-10-10 NOTE — PROGRESS NOTES
MAYDA 23w6d    She is doing well, had her prenatal labs done today. Sent message to nurses to recommend an infectious disease doctor or hepatologist for follow up care for Hep C. Dating: TIFFANY 1/31/24 by 15w2d US not c/w LMP (irregular menses)  O+ per EMR     -cfDNA desired - negative   -Carrier screening - thinks she had this in previous pregnancy & was normal   -anatomy US at 20 wk - incomplete fetal anatomy, nose/lips not seen, multiple sub-optimal cardia views including ductal & aortic arches, left ventricular outflow tract   -Follow up L2US - normal      H/o IV heroin use  -last use 2019  -per Bhumi Hindsyaz did have acute drug intoxication in 6/2020  [facility] referral - declines     +H/o hepatitis C (of note, patient did not disclose h/o hep C at initial OB visit, this was noted on review of Bhumi Cruz records)  -diagnosed during 2021-22 pregnancy [facility]CareEverywhere  -h/o IV & intranasal drug abuse  -12/22/21 HCV RNA DETECT, ,048 int units/mL. HCV RNA DETECT, QN (LOG IU/ML) 5.25   -1/31/22 Alma Chery MD - GI - for hepatitis C carrier. Was told to follow up 3 months after delivery for repeat bloodwork (hepatitis C RNA levels and genotype, fibrosure )  -will check HCV Ab, HCV RNA with reflex to genotype, fibrosure, CMP     Irregular menses & h/o abnormal 1 hr GTT   -dated by ultrasound   -A1c, CMP     Headaches  -on top & back sides of head, likely musculoskeletal/stress related  -encouraged adequate hydration, attention to posture  -can try magnesium     Overweight BMI 25.8  -Wt gain goal 15-25 lb discussed.

## 2023-10-10 NOTE — TELEPHONE ENCOUNTER
----- Message from ARJUN Dyer sent at 10/10/2023  1:16 PM CDT -----  Regarding: infectious disease or liver doctor follow up  She has hepatitis C - recommend to follow up with infectious disease or a liver doctor that will take her insurance.

## 2023-10-10 NOTE — TELEPHONE ENCOUNTER
Pt to f/u with hepatologist Dr. Zakia Sterling- contact sent to her [employer] nona. Order pended. Patient verbalized understanding, agreed to and intend to comply with plan of care.

## 2023-10-10 NOTE — TELEPHONE ENCOUNTER
Pt calling to speak to the clinical supervisor about her Moshe Deluca 9013 appointments she has had with Hyacinth DÍAZ. Pt is asking to please not have to see her for pregnancy or post partum. Pt states she does not prefer Dr. Ferdie Kawasaki either because she takes to long. Pt states she would like the clinical supervisor to know how TK makes her feel. Please advise.

## 2023-10-11 LAB
HCV LOG10 2: 2.97 LOG10 IU/ML
HCV LOG10: 2.87 LOG10 IU/ML
HEP C QN 2: 930 IU/ML
HEP C QN: 740 IU/ML
Lab: 0.01
Lab: 0.02
Lab: 0.2 MG/DL
Lab: 10 IU/L
Lab: 114 MG/DL
Lab: 201 MG/DL
Lab: 294 MG/DL
Lab: 6 IU/L

## 2023-10-12 PROBLEM — O09.899 RUBELLA NON-IMMUNE STATUS, ANTEPARTUM (HCC): Status: ACTIVE | Noted: 2023-10-12

## 2023-10-12 PROBLEM — B18.2 CHRONIC HEPATITIS C AFFECTING ANTEPARTUM CARE OF MOTHER IN SECOND TRIMESTER (HCC): Status: ACTIVE | Noted: 2023-08-11

## 2023-10-12 PROBLEM — O98.412 CHRONIC HEPATITIS C AFFECTING ANTEPARTUM CARE OF MOTHER IN SECOND TRIMESTER (HCC): Status: ACTIVE | Noted: 2023-08-11

## 2023-10-12 PROBLEM — B18.2 CHRONIC HEPATITIS C (HCC): Status: ACTIVE | Noted: 2023-10-12

## 2023-10-12 PROBLEM — Z28.39 RUBELLA NON-IMMUNE STATUS, ANTEPARTUM (HCC): Status: ACTIVE | Noted: 2023-10-12

## 2023-10-12 PROBLEM — Z28.39 RUBELLA NON-IMMUNE STATUS, ANTEPARTUM: Status: ACTIVE | Noted: 2023-10-12

## 2023-10-12 PROBLEM — O09.899 RUBELLA NON-IMMUNE STATUS, ANTEPARTUM: Status: ACTIVE | Noted: 2023-10-12

## 2023-10-13 NOTE — PROGRESS NOTES
Discussed providers message: results and recommendation, Active hepatitis C virus. Recommend establish with hepatologist. Order for Dr. Ashok Pace already placed 10/10/23 - pt will call and schedule an appt. Not immune to rubella. Will need MMR after delivery. patient verbalized understanding.

## 2023-11-09 ENCOUNTER — LAB ENCOUNTER (OUTPATIENT)
Dept: LAB | Facility: HOSPITAL | Age: 28
End: 2023-11-09
Payer: MEDICAID

## 2023-11-09 DIAGNOSIS — Z13.0 SCREENING FOR DEFICIENCY ANEMIA: ICD-10-CM

## 2023-11-09 DIAGNOSIS — Z13.1 DIABETES MELLITUS SCREENING: ICD-10-CM

## 2023-11-09 DIAGNOSIS — Z11.4 ENCOUNTER FOR SCREENING FOR HIV: ICD-10-CM

## 2023-11-09 LAB
BASOPHILS # BLD AUTO: 0.02 X10(3) UL (ref 0–0.2)
BASOPHILS NFR BLD AUTO: 0.2 %
EOSINOPHIL # BLD AUTO: 0.06 X10(3) UL (ref 0–0.7)
EOSINOPHIL NFR BLD AUTO: 0.6 %
ERYTHROCYTE [DISTWIDTH] IN BLOOD BY AUTOMATED COUNT: 12.3 %
GLUCOSE 1H P GLC SERPL-MCNC: 100 MG/DL
HCT VFR BLD AUTO: 36.2 %
HGB BLD-MCNC: 12.3 G/DL
IMM GRANULOCYTES # BLD AUTO: 0.07 X10(3) UL (ref 0–1)
IMM GRANULOCYTES NFR BLD: 0.7 %
LYMPHOCYTES # BLD AUTO: 1.55 X10(3) UL (ref 1–4)
LYMPHOCYTES NFR BLD AUTO: 15 %
MCH RBC QN AUTO: 31.1 PG (ref 26–34)
MCHC RBC AUTO-ENTMCNC: 34 G/DL (ref 31–37)
MCV RBC AUTO: 91.6 FL
MONOCYTES # BLD AUTO: 0.75 X10(3) UL (ref 0.1–1)
MONOCYTES NFR BLD AUTO: 7.3 %
NEUTROPHILS # BLD AUTO: 7.89 X10 (3) UL (ref 1.5–7.7)
NEUTROPHILS # BLD AUTO: 7.89 X10(3) UL (ref 1.5–7.7)
NEUTROPHILS NFR BLD AUTO: 76.2 %
PLATELET # BLD AUTO: 232 10(3)UL (ref 150–450)
RBC # BLD AUTO: 3.95 X10(6)UL
WBC # BLD AUTO: 10.3 X10(3) UL (ref 4–11)

## 2023-11-09 PROCEDURE — 36415 COLL VENOUS BLD VENIPUNCTURE: CPT

## 2023-11-09 PROCEDURE — 82950 GLUCOSE TEST: CPT

## 2023-11-09 PROCEDURE — 85025 COMPLETE CBC W/AUTO DIFF WBC: CPT

## 2023-11-09 PROCEDURE — 87389 HIV-1 AG W/HIV-1&-2 AB AG IA: CPT

## 2023-11-13 ENCOUNTER — ROUTINE PRENATAL (OUTPATIENT)
Facility: CLINIC | Age: 28
End: 2023-11-13
Payer: MEDICAID

## 2023-11-13 VITALS
SYSTOLIC BLOOD PRESSURE: 112 MMHG | BODY MASS INDEX: 30.73 KG/M2 | HEIGHT: 66 IN | HEART RATE: 68 BPM | DIASTOLIC BLOOD PRESSURE: 66 MMHG | WEIGHT: 191.19 LBS

## 2023-11-13 DIAGNOSIS — Z34.83 ENCOUNTER FOR SUPERVISION OF OTHER NORMAL PREGNANCY IN THIRD TRIMESTER: Primary | ICD-10-CM

## 2023-11-13 NOTE — PROGRESS NOTES
MAYDA - 28w5d     Pt having more pains on her sides when she is working 12 hour shifts as  - pt reassured, she is going to do shorter shifts starting in a few weeks    Dating: TIFFANY 1/31/24 by 15w2d US not c/w LMP (irregular menses)  O+ per EMR     -cfDNA desired - negative   -Carrier screening - thinks she had this in previous pregnancy & was normal   -anatomy US at 20 wk - incomplete fetal anatomy, nose/lips not seen, multiple sub-optimal cardia views including ductal & aortic arches, left ventricular outflow tract   -Follow up L2US - normal   -1h GTT, CBC, HIV normal  -Tdap done     H/o IV heroin use  -last use 2019  -per Rita Benavides did have acute drug intoxication in 6/2020  [facility]     +H/o hepatitis C (of note, patient did not disclose h/o hep C at initial OB visit, this was noted on review of Rita Benavides records)  -diagnosed during 2021-22 pregnancy [facility]CareEverywhere  -h/o IV & intranasal drug abuse  -12/22/21 HCV RNA DETECT, ,456 int units/mL. HCV RNA DETECT, QN (LOG IU/ML) 5.25   -1/31/22 Sammi Iraheta MD - GI - for hepatitis C carrier. Was told to follow up 3 months after delivery for repeat bloodwork (hepatitis C RNA levels and genotype, fibrosure )  -will check HCV Ab, HCV RNA with reflex to genotype, fibrosure, CMP - HCV quant 740 Iunit /mL, CMP ok, low concern for fibrosis  -pt now seeing Dr Saulo Hubbard, they are deferring treatment until after delivery,        Headaches  -on top & back sides of head, likely musculoskeletal/stress related  -encouraged adequate hydration, attention to posture  -can try magnesium     Overweight BMI 25.8  -Wt gain goal 15-25 lb discussed.

## 2023-11-27 ENCOUNTER — ROUTINE PRENATAL (OUTPATIENT)
Facility: CLINIC | Age: 28
End: 2023-11-27
Payer: MEDICAID

## 2023-11-27 VITALS
SYSTOLIC BLOOD PRESSURE: 120 MMHG | WEIGHT: 194.63 LBS | HEIGHT: 66 IN | DIASTOLIC BLOOD PRESSURE: 66 MMHG | BODY MASS INDEX: 31.28 KG/M2 | HEART RATE: 88 BPM

## 2023-11-27 DIAGNOSIS — Z28.39 RUBELLA NON-IMMUNE STATUS, ANTEPARTUM: ICD-10-CM

## 2023-11-27 DIAGNOSIS — O26.03 EXCESSIVE WEIGHT GAIN DURING PREGNANCY IN THIRD TRIMESTER: ICD-10-CM

## 2023-11-27 DIAGNOSIS — O09.899 RUBELLA NON-IMMUNE STATUS, ANTEPARTUM: ICD-10-CM

## 2023-11-27 DIAGNOSIS — E66.3 OVERWEIGHT (BMI 25.0-29.9): ICD-10-CM

## 2023-11-27 DIAGNOSIS — Z34.83 PRENATAL CARE, SUBSEQUENT PREGNANCY IN THIRD TRIMESTER: ICD-10-CM

## 2023-11-27 DIAGNOSIS — O98.413 CHRONIC HEPATITIS C AFFECTING ANTEPARTUM CARE OF MOTHER IN THIRD TRIMESTER (HCC): Primary | ICD-10-CM

## 2023-11-27 DIAGNOSIS — Z28.21 INFLUENZA VACCINATION DECLINED BY PATIENT: ICD-10-CM

## 2023-11-27 DIAGNOSIS — B18.2 CHRONIC HEPATITIS C AFFECTING ANTEPARTUM CARE OF MOTHER IN THIRD TRIMESTER (HCC): Primary | ICD-10-CM

## 2023-11-27 PROBLEM — O35.BXX0 FETAL CARDIAC ANOMALY COMPLICATING PREGNANCY, ANTEPARTUM: Status: RESOLVED | Noted: 2023-09-20 | Resolved: 2023-11-27

## 2023-11-27 PROBLEM — O35.BXX0 FETAL CARDIAC ANOMALY COMPLICATING PREGNANCY, ANTEPARTUM (HCC): Status: RESOLVED | Noted: 2023-09-20 | Resolved: 2023-11-27

## 2023-11-27 NOTE — PATIENT INSTRUCTIONS
Miralax daily       EXCESSIVE WEIGHT GAIN  Gaining too much weight increases the risk of a large fetus. A larger fetus places itself and the mother at risk for injury during birth and increases the risk that it will not be able to descend through the pelvis, making a  section necessary. Controlling weight gain in pregnancy:  Look at current diet - write down everything you eat for a few days. Count up your mg protein, grams of fiber, etc.   Protein - aim for 75 grams per day. [email or web address]. Add (plain- no herbal additives, etc) protein powder if needed. Aim for 1 gallon of water daily   Fiber rich foods. 25-28 grams per day. Heartburn medication if needed   Avoid fast/simple carbohydrates (sodas) - this can spike your insulin levels & can trigger hypoglycemia which can cause ravenous hunger  Adequate sleep important for hormonal regulation of appetite. Avoid high sodium foods. Aim for potassium-rich foods. These will have a slight diuretic effect. Walk for at least 15 minutes after every meal.     Basic sample meal plan:    Breakfast: 15-30 g carbs for breakfast  Mid morning snack (if hungry): 15 -30 g carb   Lunch: 45-60 g carb  Mid afternoon snack (if hungry): 15-30 g carb  Dinner: 45-60 g carb   Bedtime snack if hungry can be 15 g carb with some protein. Snack should be between 8-10 pm         [facility] [place] Prenatal Classes  [email or web address]  [phone]    Pediatrics/Family Medicine (Doctor for baby)    Pediatrics - birth through 25years of age  Family Medicine/Practice - birth through adulthood    Please select a pediatrician or family medicine provider BEFORE you are admitted to the hospital to give birth. Make sure that provider accepts your insurance. Alroy Scriver a provider that is close to where you live.     If the provider is on staff at [facility], the nursing staff will notify them when your infant is born so they are aware to come to the hospital to examine the infant. If the provider you have chosen is not on staff at THE [facility], a pediatric hospitalist will care for your infant during the hospitalization only. You must arrange the follow up visit with your provider. After delivery at the hospital follow up visit instructions for baby will be provided prior to discharge. The baby will not be able to leave the hospital without a follow up visit scheduled. To establish care:  [email or web address]  Or   [facility] [address] Physician Referral line at [phone]      There are MANY providers available. It would be impossible to list them all, but here are a few popular pediatrics groups in [facility]:    [address] [phone]  [place] [address] [facility] [address] [facility] [phone]    There are also many wonderful independent practitioners as well. We recommend you speak with family, friends, colleagues as personal recommendations can be very helpful.

## 2023-11-27 NOTE — PROGRESS NOTES
MAYDA    +FM. Tired a lot. Some early satiety. Constipation - BM was 2 times per day prior to pregnancy. Now about every 3 days. Walking a lot at work. Maybe not drinking enough water. Has been drinking more regular soda (non-caffeinated). No ctx, lof, vb.     33year old  at 30w5d   TIFFANY 24 by 15w2d US not c/w LMP (irregular menses)  O+ per EMR    -cfDNA neg   -Carrier screening - thinks she had this in previous pregnancy & was normal   -anatomy US limited cardiac views -> L2 US normal at 22w6d  -1 hr GTT, CBC, 3rd trim HIV done.   -Tdap done  -Flu  declines  -RSV vaccine discussed - would like at next visit.   -classes, pre-registration, pediatrician, breast pump, car seat info     H/o IV heroin use  -last use   -per [facility] did have acute drug intoxication in 2020  [facility] referral - declines    +H/o hepatitis C (of note, patient did not disclose h/o hep C at initial OB visit, this was noted on review of [facility] records)  -diagnosed during - pregnancy [facility]  -h/o IV & intranasal drug abuse  -21 HCV RNA DETECT, ,695 int units/mL. HCV RNA DETECT, QN (LOG IU/ML) 5.25   -22 Kenji Gonzalez MD - GI - for hepatitis C carrier. Was told to follow up 3 months after delivery for repeat bloodwork (hepatitis C RNA levels and genotype, fibrosure )  -10/10/23 HCV Ab, HCV RNA with reflex to genotype, fibrosure, CMP - Active hepatitis C virus. 10/10/23  units/mL or 2.968 log 10 units/ML    -Recommend establish with hepatologist. Dr. Pamela Chaney.  Appt 10/19/23  - Get lab soon after delivery (Order is in [facility] system)  - Will then plan on treating   -not advised to have  delivery, unless indicated for other reasons  -can breastfeed, but should abstain from breastfeeding when their nipples are cracked or bleeding    Irregular menses & h/o abnormal 1 hr GTT   -dated by ultrasound   -10/10/23 A1c, CMP ok     Headaches  -on top & back sides of head, likely musculoskeletal/stress related  -encouraged adequate hydration, attention to posture  -can try magnesium    Overweight BMI 25.8 & excessive weight gain   -Wt gain goal 15-25 lb discussed. Up 34 lb   -Cautioned regarding increased risk of fetal macrosomia, birth injury for fetus and mother, need for  section.         Constipation, 2023 at 30w5d   -advised Miralax daily for now    Rubella non immune  -MMR postpartum     RTC 2 wk

## 2023-12-12 ENCOUNTER — ROUTINE PRENATAL (OUTPATIENT)
Facility: CLINIC | Age: 28
End: 2023-12-12
Payer: MEDICAID

## 2023-12-12 VITALS
WEIGHT: 193 LBS | SYSTOLIC BLOOD PRESSURE: 102 MMHG | HEIGHT: 66 IN | DIASTOLIC BLOOD PRESSURE: 62 MMHG | HEART RATE: 78 BPM | BODY MASS INDEX: 31.02 KG/M2

## 2023-12-12 DIAGNOSIS — Z34.83 PRENATAL CARE, SUBSEQUENT PREGNANCY IN THIRD TRIMESTER: Primary | ICD-10-CM

## 2023-12-12 DIAGNOSIS — Z3A.32 32 WEEKS GESTATION OF PREGNANCY: ICD-10-CM

## 2023-12-12 PROCEDURE — 99213 OFFICE O/P EST LOW 20 MIN: CPT | Performed by: OBSTETRICS & GYNECOLOGY

## 2023-12-12 PROCEDURE — 3078F DIAST BP <80 MM HG: CPT | Performed by: OBSTETRICS & GYNECOLOGY

## 2023-12-12 PROCEDURE — 3008F BODY MASS INDEX DOCD: CPT | Performed by: OBSTETRICS & GYNECOLOGY

## 2023-12-12 PROCEDURE — 3074F SYST BP LT 130 MM HG: CPT | Performed by: OBSTETRICS & GYNECOLOGY

## 2023-12-12 NOTE — PROGRESS NOTES
Patient has no complaints    TIFFANY 24 by 15w2d US not c/w LMP (irregular menses)      -cfDNA neg   -Carrier screening - thinks she had this in previous pregnancy & was normal   -anatomy US limited cardiac views -> L2 US normal at 22w6d  -1 hr GTT, CBC, 3rd trim HIV done.   -Tdap done  -Flu - declines  -RSV next visit  -classes, pre-registration, pediatrician, breast pump, car seat info     H/o IV heroin use  -last use   -per [place] did have acute drug intoxication in 2020  [facility] referral - declines    +H/o hepatitis C (of note, patient did not disclose h/o hep C at initial OB visit, this was noted on review of [facility] records)  -diagnosed during  pregnancy [facility]  -h/o IV & intranasal drug abuse  -21 HCV RNA DETECT, ,716 int units/mL. HCV RNA DETECT, QN (LOG IU/ML) 5.25   -22 Jaren Valdivia MD - GI - for hepatitis C carrier. Was told to follow up 3 months after delivery for repeat bloodwork (hepatitis C RNA levels and genotype, fibrosure )  -10/10/23 HCV Ab, HCV RNA with reflex to genotype, fibrosure, CMP - Active hepatitis C virus. 10/10/23  units/mL or 2.968 log 10 units/ML    -Recommend establish with hepatologist. Dr. Leana Bean. Appt 10/19/23  - Get lab soon after delivery (Order is in [employer] system)  - Will then plan on treating   -not advised to have  delivery, unless indicated for other reasons  -can breastfeed, but should abstain from breastfeeding when their nipples are cracked or bleeding    Irregular menses & h/o abnormal 1 hr GTT   -dated by ultrasound   -10/10/23 A1c, CMP ok     Headaches  -on top & back sides of head, likely musculoskeletal/stress related  -encouraged adequate hydration, attention to posture  -can try magnesium    Overweight BMI 25.8 & excessive weight gain   -Wt gain goal 15-25 lb discussed.  Up 34 lb   -Cautioned regarding increased risk of fetal macrosomia, birth injury for fetus and mother, need for  section.         Constipation, 11/27/2023 at 30w5d   -advised Miralax daily for now    Rubella non immune  -MMR postpartum

## 2024-01-02 ENCOUNTER — TELEPHONE (OUTPATIENT)
Facility: CLINIC | Age: 29
End: 2024-01-02

## 2024-01-02 ENCOUNTER — ROUTINE PRENATAL (OUTPATIENT)
Facility: CLINIC | Age: 29
End: 2024-01-02
Payer: MEDICAID

## 2024-01-02 VITALS
HEART RATE: 63 BPM | WEIGHT: 194 LBS | DIASTOLIC BLOOD PRESSURE: 60 MMHG | BODY MASS INDEX: 31.18 KG/M2 | HEIGHT: 66 IN | SYSTOLIC BLOOD PRESSURE: 122 MMHG

## 2024-01-02 DIAGNOSIS — O98.413 CHRONIC HEPATITIS C AFFECTING ANTEPARTUM CARE OF MOTHER IN THIRD TRIMESTER (HCC): Primary | ICD-10-CM

## 2024-01-02 DIAGNOSIS — Z87.59 HISTORY OF PREGNANCY WITH ABNORMAL GLUCOSE TOLERANCE TEST (GTT): ICD-10-CM

## 2024-01-02 DIAGNOSIS — Z29.11 NEED FOR RSV IMMUNIZATION: ICD-10-CM

## 2024-01-02 DIAGNOSIS — E66.3 OVERWEIGHT (BMI 25.0-29.9): ICD-10-CM

## 2024-01-02 DIAGNOSIS — Z34.83 PRENATAL CARE, SUBSEQUENT PREGNANCY IN THIRD TRIMESTER: ICD-10-CM

## 2024-01-02 DIAGNOSIS — B18.2 CHRONIC HEPATITIS C AFFECTING ANTEPARTUM CARE OF MOTHER IN THIRD TRIMESTER (HCC): Primary | ICD-10-CM

## 2024-01-02 DIAGNOSIS — O26.03 EXCESSIVE WEIGHT GAIN DURING PREGNANCY IN THIRD TRIMESTER: ICD-10-CM

## 2024-01-02 DIAGNOSIS — Z28.39 RUBELLA NON-IMMUNE STATUS, ANTEPARTUM: ICD-10-CM

## 2024-01-02 DIAGNOSIS — F19.91 HISTORY OF INTRAVENOUS DRUG USE IN REMISSION: ICD-10-CM

## 2024-01-02 DIAGNOSIS — O09.899 RUBELLA NON-IMMUNE STATUS, ANTEPARTUM: ICD-10-CM

## 2024-01-02 PROCEDURE — 87081 CULTURE SCREEN ONLY: CPT | Performed by: OBSTETRICS & GYNECOLOGY

## 2024-01-02 PROCEDURE — 90471 IMMUNIZATION ADMIN: CPT | Performed by: OBSTETRICS & GYNECOLOGY

## 2024-01-02 PROCEDURE — 3074F SYST BP LT 130 MM HG: CPT | Performed by: OBSTETRICS & GYNECOLOGY

## 2024-01-02 PROCEDURE — 3078F DIAST BP <80 MM HG: CPT | Performed by: OBSTETRICS & GYNECOLOGY

## 2024-01-02 PROCEDURE — 90678 RSV VACC PREF BIVALENT IM: CPT | Performed by: OBSTETRICS & GYNECOLOGY

## 2024-01-02 PROCEDURE — 87150 DNA/RNA AMPLIFIED PROBE: CPT | Performed by: OBSTETRICS & GYNECOLOGY

## 2024-01-02 PROCEDURE — 3008F BODY MASS INDEX DOCD: CPT | Performed by: OBSTETRICS & GYNECOLOGY

## 2024-01-02 PROCEDURE — 99213 OFFICE O/P EST LOW 20 MIN: CPT | Performed by: OBSTETRICS & GYNECOLOGY

## 2024-01-02 NOTE — PROGRESS NOTES
MAYDA    +FM. Baby hasn't dropped yet. Occasional mild contractions. No bleeding or leaking.     28 year old  at 35w6d   TIFFANY 24 by 15w2d US not c/w LMP (irregular menses)  O+ per EMR    -cfDNA neg, BOY  -Carrier screening - thinks she had this in previous pregnancy & was normal   -anatomy US limited cardiac views -> L2 US normal at 22w6d  -1 hr GTT, CBC, 3rd trim HIV done.   -Tdap done  -Flu  declines  -RSV vaccine plan today, 2024     -classes, pre-registration, pediatrician, breast pump, car seat info   -vit K & erythromycin eye ointment discussed  -GBS collected. Cervix 1.5/50%/-2, soft consistency, cephalic 2024 at 11:58 AM     IOL at 39-40 wk discussed. Pt in agreement. Message to  sent.     H/o IV heroin use  -last use   [facility]  - referral - declines    +H/o hepatitis C (of note, patient did not disclose h/o hep C at initial OB visit, this was noted on review of [facility] records)  -diagnosed during  pregnancy [facility]where  -h/o IV & intranasal drug abuse  -21 HCV RNA DETECT, ,828 int units/mL. HCV RNA DETECT, QN (LOG IU/ML) 5.25   -22 Therese Daniels MD - GI - for hepatitis C carrier. Was told to follow up 3 months after delivery for repeat bloodwork (hepatitis C RNA levels and genotype, fibrosure )  -10/10/23 HCV Ab, HCV RNA with reflex to genotype, fibrosure, CMP - Active hepatitis C virus. 10/10/23  units/mL or 2.968 log 10 units/ML    -Recommend establish with hepatologist. Dr. Jonathon Croft. Appt 10/19/23  - Get lab soon after delivery (Order is in [place] system)  - Will then plan on treating   -not advised to have  delivery, unless indicated for other reasons  -can breastfeed, but should abstain from breastfeeding when their nipples are cracked or bleeding    Irregular menses & h/o abnormal 1 hr GTT   -dated by ultrasound   -10/10/23 A1c, CMP ok     Headaches  -on top &  back sides of head, likely musculoskeletal/stress related  -encouraged adequate hydration, attention to posture  -can try magnesium    Overweight BMI 25.8 & excessive weight gain   -Wt gain goal 15-25 lb discussed. Up 34 lb   -Cautioned regarding increased risk of fetal macrosomia, birth injury for fetus and mother, need for  section.     Constipation, 2023 at 30w5d   -advised Miralax daily for now    Rubella non immune  -MMR postpartum     RTC 1 wk

## 2024-01-02 NOTE — PATIENT INSTRUCTIONS
Excessive weight gain  -Cautioned regarding increased risk of fetal macrosomia, birth injury for fetus and mother, need for  section.       Consider induction of labor  Induction of labor at 39-40 wk. Recent studies show best outcomes for mothers and infants in most pregnancies with delivery at this gestational age. Fetal lung maturity should be adequate. Avoiding additional fetal weight gain and an aging placenta are the other benefits. There is a lower  rate for inductions between 39-40 weeks compared with expectant management at that time.         Group B strep vaginal swab at next visit at 36 wk     Can breastfeed, but should abstain when nipples are cracked or bleeding.       [facility] Prenatal Classes  [email or web address]  [phone]     Pediatrics/Family Medicine (Doctor for baby)    Pediatrics - birth through 18 years of age  Family Medicine/Practice - birth through adulthood    Please select a pediatrician or family medicine provider BEFORE you are admitted to the hospital to give birth.     Make sure that provider accepts your insurance.   Pick a provider that is close to where you live.    If the provider is on staff at [facility], the nursing staff will notify them when your infant is born so they are aware to come to the hospital to examine the infant.     If the provider you have chosen is not on staff at [facility], a pediatric hospitalist will care for your infant during the hospitalization only. You must arrange the follow up visit with your provider.     After delivery at the hospital follow up visit instructions for baby will be provided prior to discharge.     The baby will not be able to leave the hospital without a follow up visit scheduled.     To establish care:  [email or web address]  Or   [facility] Physician Referral line at [phone]      There are MANY providers available. It would be impossible to list them all, but  here are a few popular pediatrics groups in [place]:    [facility] [place] [phone]   [facility] [place] [phone]  [facility] [place] [phone]  [facility] [place] [phone]  [facility] [place] [phone]  [facility] [phone]    There are also many wonderful independent practitioners as well. We recommend you speak with family, friends, colleagues as personal recommendations can be very helpful.     Erythromycin eye ointment     In the , the risk of joyce gonococcal conjunctivitis is reduced by prophylactic administration of ophthalmic antibiotic agents shortly after birth.  Ocular prophylaxis is safe, easy to administer, and an inexpensive method to prevent sight-threatening gonococcal ophthalmia. In the [place], routine prophylaxis against gonococcal ophthalmia neonatorum with erythromycin ophthalmic ointment is recommended by the US Preventive Services Task Force, the American Academy of Pediatrics (AAP), and the Centers for Disease Control and Prevention (CDC). The ophthalmic antibiotic should be applied in each eye within two hours of birth, regardless of mode of delivery. UpToDate accessed 2023    Vitamin K injection     Prophylactic administration of vitamin K (as a single dose given intramuscularly within 6 hours of birth) is recommended for all newborns to prevent vitamin K deficient bleeding (VKDB; previously referred to as hemorrhagic disease of the ).     If you choose to refuse vitamin K for your (s) you will be asked to sign a consent form at the hospital that will include the following:    Informed Refusal of Vitamin K for the [place]  At birth, all infants are deficient in Vitamin K. Vitamin K is necessary for blood to form a clot. Vitamin  K deficiency can result in bleeding during the first days or weeks of life (hemorrhagic disease of the )  and  infants who are exclusively breast fed are at greatest risk. In the most severe cases, the bleeding occurs  into the brain (4 to 7 / 100,000 births) and can result in permanent brain damage. The American Academy of  Pediatrics recommends a Vitamin K injection at birth (into the tissue beneath the skin) which can greatly  reduce the risk of hemorrhage. Oral forms are not readily available in the  and have not been shown to be  as effective as the injection.    I have read the above information provided and have had all my questions answered regarding my  decision to refuse the treatment of my  infant on the grounds that the treatment violates my  personal/Worship beliefs. I understand that my refusal to consent to the recommended treatment may  seriously impair the health of my infant up to and including bleeding into the brain or death. While I  understand the possible consequences, I nevertheless refuse to submit the recommended treatment. I  assume responsibility for the consequences of this refusal and release [facility], its affiliates  and subsidiaries, its employees and agents and the physicians or midwifes from any and all liability associated  with my refusal to permit treatment.      More information:     A single IM dose of vitamin K appears to be more effective in preventing late-onset VKDB compared with oral vitamin K regimens.     Parental refusal - The rate of parental refusal of  vitamin K appears to be increasing in the [place] and other countries. Approximately one-half of severe cases of VKDB are associated with parental refusal of vitamin K therapy during the birth hospitalization. In a national surveillance study from [place], there were six cases of fatal VKDB-related intracranial hemorrhage reported over 24 years; three of the six cases occurred in infants who were not given vitamin K due to parental refusal.    It is imperative that parents are counseled  about the potential dire consequences of VKDB (eg, intestinal hemorrhage, intracranial hemorrhage (stroke) with subsequent neurodevelopmental impairment and possible death) if their  does not receive vitamin K.    VITAMIN K-DEFICIENT BLEEDING IN NEWBORNS AND YOUNG INFANTS  Vitamin K deficiency is common in the , and if vitamin K is not replaced, the infant is at risk for vitamin K deficiency bleeding (VKDB), previously known as hemorrhagic disease of the .    Pathogenesis --  infants are at risk for vitamin K deficiency because their immature liver does not efficiently utilize vitamin K. In addition, they tend to have low vitamin K stores because of the low vitamin K content of breast milk, a sterile gut, and poor placental transfer of vitamin K. In infants, the plasma concentrations of all vitamin K-dependent factors are approximately 20 percent of the adult values. Within a month after birth, the levels rise to within normal limits.     Clinical features -- VKDB is characterized by cutaneous bruising or bleeding from mucosal surfaces, the gastrointestinal tract, umbilicus or circumcision site, and/or intracranial hemorrhage (ICH) (stroke).     ?Early-onset VKDB develops within the first 24 hours of life and is usually associated with maternal medications that block vitamin K action (eg, anticonvulsants). It is associated with ICH in approximately 25 percent of affected infants     ?Classic VKDB develops between the second and seventh day of life and is largely prevented by administration of vitamin K at birth     ?Late-onset VKDB typically develops between three weeks and eight months of age. There is a high frequency of ICH in affected infants (eg, 50 percent in some series), and associated central nervous system symptoms such as vomiting or seizures may be the primary presenting symptoms     Late-onset VKDB and associated ICH appear to be increasing in the [place], and these  cases are associated with the parental refusal of vitamin K prophylaxis at birth, followed by exclusive breast feeding.     Evaluation -- The possibility of VKDB should be considered in an infant presenting during the first six months of life with bleeding, bruising, lethargy, or fussiness, especially if they are exclusively  or did not receive vitamin K prophylaxis at birth. If there is a strong clinical suspicion of VKDB (eg, clinically apparent bleeding and known refusal of vitamin K prophylaxis), the infant should be treated immediately, even before test results are available.     Evaluation should include laboratory testing of prothrombin time (PT) and international normalized ratio (INR), both of which are prolonged in vitamin K deficiency. Infants with abnormal results or neurologic symptoms should have urgent neuroimaging to detect brain hemorrhage. A complete blood count with platelets and fibrinogen should also be performed to exclude other causes of bleeding.     Prevention -- All  infants are given vitamin K shortly after birth to prevent VKDB.     Treatment -- A presumptive diagnosis of VKDB should be made in an infant presenting with bleeding or neurologic symptoms and either prolonged PT or INR, or a history of not receiving vitamin K prophylaxis at birth. Such infants should be treated immediately with parenteral vitamin K (phytonadione), 1 to 2 mg intravenously or subcutaneously. The vitamin K dose should normalize the coagulation profile within two to three hours. For severe bleeding episodes, fresh frozen plasma or prothrombin complex concentration may be administered in addition to vitamin K.    UpToDate accessed 2023

## 2024-01-02 NOTE — TELEPHONE ENCOUNTER
----- Message from Marissa Wong MD sent at 1/2/2024 12:00 PM CST -----  Regarding: Please schedule IOL  Please schedule elective IOL at 39-40 wk. Pitocin in the morning. Thanks!

## 2024-01-04 LAB — GROUP B STREP BY PCR FOR PCR OVT: POSITIVE

## 2024-01-09 ENCOUNTER — ROUTINE PRENATAL (OUTPATIENT)
Facility: CLINIC | Age: 29
End: 2024-01-09
Payer: MEDICAID

## 2024-01-09 VITALS
HEART RATE: 74 BPM | DIASTOLIC BLOOD PRESSURE: 64 MMHG | SYSTOLIC BLOOD PRESSURE: 102 MMHG | BODY MASS INDEX: 32.08 KG/M2 | HEIGHT: 66 IN | WEIGHT: 199.63 LBS

## 2024-01-09 DIAGNOSIS — B18.2 CHRONIC HEPATITIS C AFFECTING ANTEPARTUM CARE OF MOTHER IN THIRD TRIMESTER (HCC): Primary | ICD-10-CM

## 2024-01-09 DIAGNOSIS — O98.413 CHRONIC HEPATITIS C AFFECTING ANTEPARTUM CARE OF MOTHER IN THIRD TRIMESTER (HCC): Primary | ICD-10-CM

## 2024-01-09 PROCEDURE — 3074F SYST BP LT 130 MM HG: CPT

## 2024-01-09 PROCEDURE — 3008F BODY MASS INDEX DOCD: CPT

## 2024-01-09 PROCEDURE — 99214 OFFICE O/P EST MOD 30 MIN: CPT

## 2024-01-09 PROCEDURE — 3078F DIAST BP <80 MM HG: CPT

## 2024-01-09 NOTE — PROGRESS NOTES
MAYDA 36w6d    She is doing well, no complaints.   IOL scheduled for 2024.   SVE 2-3cm/70/-2     TIFFANY 24 by 15w2d US not c/w LMP (irregular menses)  O+ per EMR     -cfDNA neg, BOY  -Carrier screening - thinks she had this in previous pregnancy & was normal   -anatomy US limited cardiac views -> L2 US normal at 22w6d  -1 hr GTT, CBC, 3rd trim HIV done.   -Tdap done  -Flu  declines  -RSV vaccine plan today, 2024     -classes, pre-registration, pediatrician, breast pump, car seat info   -vit K & erythromycin eye ointment discussed       H/o IV heroin use  -last use   -per [facility] did have acute drug intoxication in 2020  - referral - declines     +H/o hepatitis C (of note, patient did not disclose h/o hep C at initial OB visit, this was noted on review of [facility] records)  -diagnosed during  pregnancy [facility]  -h/o IV & intranasal drug abuse  -21 HCV RNA DETECT, ,828 int units/mL. HCV RNA DETECT, QN (LOG IU/ML) 5.25   -22 Therese Daniels MD - GI - for hepatitis C carrier. Was told to follow up 3 months after delivery for repeat bloodwork (hepatitis C RNA levels and genotype, fibrosure )  -10/10/23 HCV Ab, HCV RNA with reflex to genotype, fibrosure, CMP - Active hepatitis C virus. 10/10/23  units/mL or 2.968 log 10 units/ML    -Recommend establish with hepatologist. Dr. Jonathon Croft. Appt 10/19/23  - Get lab soon after delivery (Order is in [place] system)  - Will then plan on treating   -not advised to have  delivery, unless indicated for other reasons  -can breastfeed, but should abstain from breastfeeding when their nipples are cracked or bleeding     Irregular menses & h/o abnormal 1 hr GTT   -dated by ultrasound   -10/10/23 A1c, CMP ok      Overweight BMI 25.8 & excessive weight gain   -Wt gain goal 15-25 lb discussed. Up 34 lb   -Cautioned regarding increased risk of fetal macrosomia, birth injury for fetus and mother, need for   section.       Rubella non immune  -MMR postpartum     + GBS   -treat with abx in labor  - resistant to clindamycin

## 2024-01-11 PROBLEM — Z22.330 GBS CARRIER: Status: ACTIVE | Noted: 2024-01-11

## 2024-01-15 ENCOUNTER — ROUTINE PRENATAL (OUTPATIENT)
Facility: CLINIC | Age: 29
End: 2024-01-15
Payer: MEDICAID

## 2024-01-15 ENCOUNTER — TELEPHONE (OUTPATIENT)
Facility: CLINIC | Age: 29
End: 2024-01-15

## 2024-01-15 VITALS
HEIGHT: 66 IN | DIASTOLIC BLOOD PRESSURE: 66 MMHG | BODY MASS INDEX: 32.24 KG/M2 | SYSTOLIC BLOOD PRESSURE: 111 MMHG | HEART RATE: 75 BPM | WEIGHT: 200.63 LBS

## 2024-01-15 DIAGNOSIS — Z34.83 PRENATAL CARE, SUBSEQUENT PREGNANCY IN THIRD TRIMESTER: Primary | ICD-10-CM

## 2024-01-15 NOTE — PROGRESS NOTES
MAYDA - 37w5d   Pt doing well overall  SVE - 2-3cm, vertex    TIFFANY 24 by 15w2d US not c/w LMP (irregular menses)  O+ per EMR     -cfDNA neg, BOY  -Carrier screening - thinks she had this in previous pregnancy & was normal   -anatomy US limited cardiac views -> L2 US normal at 22w6d  -1 hr GTT, CBC, 3rd trim HIV done.   -Tdap done  -Flu  declines  -RSV vaccine plan today, 2024     -classes, pre-registration, pediatrician, breast pump, car seat info   -vit K & erythromycin eye ointment discussed  -IOL scheduled for 2/2 AM pitocin     H/o IV heroin use  -last use   -per [facility] did have acute drug intoxication in 2020  - referral - declines     +H/o hepatitis C (of note, patient did not disclose h/o hep C at initial OB visit, this was noted on review of [facility] records)  -diagnosed during  pregnancy [facility]where  -h/o IV & intranasal drug abuse  -21 HCV RNA DETECT, ,828 int units/mL. HCV RNA DETECT, QN (LOG IU/ML) 5.25   -22 Therese Daniels MD - GI - for hepatitis C carrier. Was told to follow up 3 months after delivery for repeat bloodwork (hepatitis C RNA levels and genotype, fibrosure )  -10/10/23 HCV Ab, HCV RNA with reflex to genotype, fibrosure, CMP - Active hepatitis C virus. 10/10/23  units/mL or 2.968 log 10 units/ML    -Recommend establish with hepatologist. Dr. Jonathon Croft - seen 10/19/23  - Get lab soon after delivery (Order is in [place] system)  - Will then plan on treating postpartum   -not advised to have  delivery, unless indicated for other reasons  -can breastfeed, but should abstain from breastfeeding when their nipples are cracked or bleeding     Irregular menses & h/o abnormal 1 hr GTT   -dated by ultrasound   -10/10/23 A1c, CMP ok      Overweight BMI 25.8 & excessive weight gain   -Wt gain goal 15-25 lb discussed. Up 34 lb   -Cautioned regarding increased risk of fetal macrosomia, birth injury for fetus and mother, need for   section.       Rubella non immune  -MMR postpartum     + GBS   -treat with abx in labor  -PCN allergy, resistant to clindamycin, will need vancomycin

## 2024-01-15 NOTE — TELEPHONE ENCOUNTER
Spoke with pt. She would like to have a quick ultrasound done. She does not have any pictures of the baby's profile, limited views of the nose/lips at 20 weeks. She would like to have a good picture to frame. I let her know we typically do not do another ultrasound unless it is medically necessary. But will route to a provider and call with recommendations. Verbalized understanding.

## 2024-01-15 NOTE — TELEPHONE ENCOUNTER
Pt had return OB appt with AM, wanted to discuss possibly having an additional US but forgot to ask AM during appt. Pt requested a call back from a nurse/doctor.

## 2024-01-16 NOTE — TELEPHONE ENCOUNTER
Relayed Dr. Martinez's recommendation:  Unfortunately since is not medically necessary we can't do another ultrasound. She can try a boutique ultrasound place to take more pictures for pay. However, at this far along, the baby's head may be low in her pelvis and they may not be able to get profile pictures anymore.     Patient verbalized understanding, agreed to and intend to comply with plan of care.

## 2024-01-24 ENCOUNTER — TELEPHONE (OUTPATIENT)
Facility: CLINIC | Age: 29
End: 2024-01-24

## 2024-01-24 NOTE — TELEPHONE ENCOUNTER
Pt wants to carlotta induction appt to any day before myriam'd date of Feb 2 due to an family issue.

## 2024-01-24 NOTE — TELEPHONE ENCOUNTER
Spoke with pt. She is schedule for IOL 2/2/24. Her support person lives 3 hours away and is looking for a sooner date to make it easier on her mom(support person). Will check availability at L&D and call pt back. Verbalized understanding.

## 2024-01-25 ENCOUNTER — ROUTINE PRENATAL (OUTPATIENT)
Facility: CLINIC | Age: 29
End: 2024-01-25
Payer: MEDICAID

## 2024-01-25 VITALS
WEIGHT: 204.19 LBS | BODY MASS INDEX: 32.82 KG/M2 | HEIGHT: 66 IN | HEART RATE: 78 BPM | SYSTOLIC BLOOD PRESSURE: 115 MMHG | DIASTOLIC BLOOD PRESSURE: 69 MMHG

## 2024-01-25 DIAGNOSIS — Z34.83 PRENATAL CARE, SUBSEQUENT PREGNANCY IN THIRD TRIMESTER: Primary | ICD-10-CM

## 2024-01-25 DIAGNOSIS — Z3A.39 39 WEEKS GESTATION OF PREGNANCY: ICD-10-CM

## 2024-01-25 PROCEDURE — 3078F DIAST BP <80 MM HG: CPT | Performed by: OBSTETRICS & GYNECOLOGY

## 2024-01-25 PROCEDURE — 3074F SYST BP LT 130 MM HG: CPT | Performed by: OBSTETRICS & GYNECOLOGY

## 2024-01-25 PROCEDURE — 99214 OFFICE O/P EST MOD 30 MIN: CPT | Performed by: OBSTETRICS & GYNECOLOGY

## 2024-01-25 PROCEDURE — 3008F BODY MASS INDEX DOCD: CPT | Performed by: OBSTETRICS & GYNECOLOGY

## 2024-01-25 NOTE — PROGRESS NOTES
Patient has no complaints  - labor instructions  SVE - 3-4 cm, vertex    TIFFANY 24 by 15w2d US not c/w LMP (irregular menses)       -cfDNA neg, BOY  -Carrier screening - thinks she had this in previous pregnancy & was normal   -anatomy US limited cardiac views -> L2 US normal at 22w6d  -1 hr GTT, CBC, 3rd trim HIV done.   -Tdap done  -Flu  declines  -RSV vaccine  2024     -classes, pre-registration, pediatrician, breast pump, car seat info   -vit K & erythromycin eye ointment discussed  -IOL scheduled for 2/2 AM pitocin     H/o IV heroin use  -last use   -per [facility] did have acute drug intoxication in 2020  - referral - declines     +H/o hepatitis C (of note, patient did not disclose h/o hep C at initial OB visit, this was noted on review of [facility] records)  -diagnosed during  pregnancy [facility]  -h/o IV & intranasal drug abuse  -21 HCV RNA DETECT, ,828 int units/mL. HCV RNA DETECT, QN (LOG IU/ML) 5.25   -22 Therese Daniels MD - GI - for hepatitis C carrier. Was told to follow up 3 months after delivery for repeat bloodwork (hepatitis C RNA levels and genotype, fibrosure )  -10/10/23 HCV Ab, HCV RNA with reflex to genotype, fibrosure, CMP - Active hepatitis C virus. 10/10/23  units/mL or 2.968 log 10 units/ML    -Recommend establish with hepatologist. Dr. Jonathon Croft - seen 10/19/23  - Get lab soon after delivery (Order is in [place] system)  - Will then plan on treating postpartum   -not advised to have  delivery, unless indicated for other reasons  -can breastfeed, but should abstain from breastfeeding when their nipples are cracked or bleeding     Irregular menses & h/o abnormal 1 hr GTT   -dated by ultrasound   -10/10/23 A1c, CMP ok      Overweight BMI 25.8 & excessive weight gain   -Wt gain goal 15-25 lb discussed. Up 44 lb   -Cautioned regarding increased risk of fetal macrosomia, birth injury for fetus and mother, need for   section.       Rubella non immune  -MMR postpartum     + GBS   -treat with abx in labor  -PCN allergy, resistant to clindamycin, will need vancomycin

## 2024-01-25 NOTE — TELEPHONE ENCOUNTER
Spoke with pt. Aware L&D is full 1/29 & 2/1. Will keep scheduled IOL 2/2/24. Verbalized understanding.

## 2024-01-31 ENCOUNTER — ROUTINE PRENATAL (OUTPATIENT)
Facility: CLINIC | Age: 29
End: 2024-01-31
Payer: MEDICAID

## 2024-01-31 VITALS
SYSTOLIC BLOOD PRESSURE: 108 MMHG | BODY MASS INDEX: 33.05 KG/M2 | WEIGHT: 205.63 LBS | HEIGHT: 66 IN | DIASTOLIC BLOOD PRESSURE: 70 MMHG | HEART RATE: 67 BPM

## 2024-01-31 DIAGNOSIS — O98.413 CHRONIC HEPATITIS C AFFECTING ANTEPARTUM CARE OF MOTHER IN THIRD TRIMESTER (HCC): ICD-10-CM

## 2024-01-31 DIAGNOSIS — F19.91 HISTORY OF INTRAVENOUS DRUG USE IN REMISSION: ICD-10-CM

## 2024-01-31 DIAGNOSIS — B18.2 CHRONIC HEPATITIS C AFFECTING ANTEPARTUM CARE OF MOTHER IN THIRD TRIMESTER (HCC): ICD-10-CM

## 2024-01-31 DIAGNOSIS — Z3A.40 40 WEEKS GESTATION OF PREGNANCY: ICD-10-CM

## 2024-01-31 DIAGNOSIS — Z22.330 GBS CARRIER: ICD-10-CM

## 2024-01-31 DIAGNOSIS — B18.2 CHRONIC HEPATITIS C WITHOUT HEPATIC COMA (HCC): ICD-10-CM

## 2024-01-31 DIAGNOSIS — Z34.83 PRENATAL CARE, SUBSEQUENT PREGNANCY IN THIRD TRIMESTER: Primary | ICD-10-CM

## 2024-01-31 PROCEDURE — 3078F DIAST BP <80 MM HG: CPT | Performed by: OBSTETRICS & GYNECOLOGY

## 2024-01-31 PROCEDURE — 3008F BODY MASS INDEX DOCD: CPT | Performed by: OBSTETRICS & GYNECOLOGY

## 2024-01-31 PROCEDURE — 99214 OFFICE O/P EST MOD 30 MIN: CPT | Performed by: OBSTETRICS & GYNECOLOGY

## 2024-01-31 PROCEDURE — 3074F SYST BP LT 130 MM HG: CPT | Performed by: OBSTETRICS & GYNECOLOGY

## 2024-01-31 NOTE — PROGRESS NOTES
Patient has no complaints  - labor instructions    TIFFANY 24 by 15w2d US not c/w LMP (irregular menses)       -cfDNA neg, BOY  -Carrier screening - thinks she had this in previous pregnancy & was normal   -anatomy US limited cardiac views -> L2 US normal at 22w6d  -1 hr GTT, CBC, 3rd trim HIV done.   -Tdap done  -Flu  declines  -RSV vaccine  2024     -classes, pre-registration, pediatrician, breast pump, car seat info   -vit K & erythromycin eye ointment discussed  -IOL scheduled for 2/2 AM pitocin     H/o IV heroin use  -last use   -per [facility] did have acute drug intoxication in 2020  - referral - declines     +H/o hepatitis C (of note, patient did not disclose h/o hep C at initial OB visit, this was noted on review of [facility] records)  -diagnosed during  pregnancy [facility]  -h/o IV & intranasal drug abuse  -21 HCV RNA DETECT, ,828 int units/mL. HCV RNA DETECT, QN (LOG IU/ML) 5.25   -22 Therese Daniels MD - GI - for hepatitis C carrier. Was told to follow up 3 months after delivery for repeat bloodwork (hepatitis C RNA levels and genotype, fibrosure )  -10/10/23 HCV Ab, HCV RNA with reflex to genotype, fibrosure, CMP - Active hepatitis C virus. 10/10/23  units/mL or 2.968 log 10 units/ML    -Recommend establish with hepatologist. Dr. Jonathon Croft - seen 10/19/23  - Get lab soon after delivery (Order is in [place] system)  - Will then plan on treating postpartum   -not advised to have  delivery, unless indicated for other reasons  -can breastfeed, but should abstain from breastfeeding when their nipples are cracked or bleeding     Irregular menses & h/o abnormal 1 hr GTT   -dated by ultrasound   -10/10/23 A1c, CMP ok      Overweight BMI 25.8 & excessive weight gain   -Wt gain goal 15-25 lb discussed. Up 44 lb   -Cautioned regarding increased risk of fetal macrosomia, birth injury for fetus and mother, need for  section.       Rubella  non immune  -MMR postpartum     + GBS   -treat with abx in labor  -PCN allergy, resistant to clindamycin, will need vancomycin

## 2024-02-02 ENCOUNTER — APPOINTMENT (OUTPATIENT)
Dept: OBGYN CLINIC | Facility: HOSPITAL | Age: 29
End: 2024-02-02
Payer: MEDICAID

## 2024-02-02 ENCOUNTER — HOSPITAL ENCOUNTER (INPATIENT)
Facility: HOSPITAL | Age: 29
LOS: 2 days | Discharge: HOME | End: 2024-02-04
Attending: STUDENT IN AN ORGANIZED HEALTH CARE EDUCATION/TRAINING PROGRAM | Admitting: STUDENT IN AN ORGANIZED HEALTH CARE EDUCATION/TRAINING PROGRAM
Payer: MEDICAID

## 2024-02-02 PROBLEM — Z34.90 PREGNANCY (HCC): Status: ACTIVE | Noted: 2024-02-02

## 2024-02-02 PROBLEM — Z34.90 PREGNANCY: Status: ACTIVE | Noted: 2024-02-02

## 2024-02-02 LAB
ANTIBODY SCREEN: NEGATIVE
BASOPHILS # BLD AUTO: 0.02 X10(3) UL (ref 0–0.2)
BASOPHILS NFR BLD AUTO: 0.2 %
EOSINOPHIL # BLD AUTO: 0.03 X10(3) UL (ref 0–0.7)
EOSINOPHIL NFR BLD AUTO: 0.3 %
ERYTHROCYTE [DISTWIDTH] IN BLOOD BY AUTOMATED COUNT: 13.6 %
HCT VFR BLD AUTO: 37.3 %
HGB BLD-MCNC: 12.2 G/DL
IMM GRANULOCYTES # BLD AUTO: 0.04 X10(3) UL (ref 0–1)
IMM GRANULOCYTES NFR BLD: 0.4 %
LYMPHOCYTES # BLD AUTO: 1.6 X10(3) UL (ref 1–4)
LYMPHOCYTES NFR BLD AUTO: 16.1 %
MCH RBC QN AUTO: 30.1 PG (ref 26–34)
MCHC RBC AUTO-ENTMCNC: 32.7 G/DL (ref 31–37)
MCV RBC AUTO: 92.1 FL
MONOCYTES # BLD AUTO: 0.77 X10(3) UL (ref 0.1–1)
MONOCYTES NFR BLD AUTO: 7.7 %
NEUTROPHILS # BLD AUTO: 7.48 X10 (3) UL (ref 1.5–7.7)
NEUTROPHILS # BLD AUTO: 7.48 X10(3) UL (ref 1.5–7.7)
NEUTROPHILS NFR BLD AUTO: 75.3 %
PLATELET # BLD AUTO: 216 10(3)UL (ref 150–450)
RBC # BLD AUTO: 4.05 X10(6)UL
RH BLOOD TYPE: POSITIVE
T PALLIDUM AB SER QL IA: NONREACTIVE
WBC # BLD AUTO: 9.9 X10(3) UL (ref 4–11)

## 2024-02-02 RX ORDER — DEXTROSE, SODIUM CHLORIDE, SODIUM LACTATE, POTASSIUM CHLORIDE, AND CALCIUM CHLORIDE 5; .6; .31; .03; .02 G/100ML; G/100ML; G/100ML; G/100ML; G/100ML
INJECTION, SOLUTION INTRAVENOUS AS NEEDED
Status: DISCONTINUED | OUTPATIENT
Start: 2024-02-02 | End: 2024-02-03 | Stop reason: HOSPADM

## 2024-02-02 RX ORDER — ONDANSETRON 2 MG/ML
4 INJECTION INTRAMUSCULAR; INTRAVENOUS EVERY 6 HOURS PRN
Status: DISCONTINUED | OUTPATIENT
Start: 2024-02-02 | End: 2024-02-03 | Stop reason: HOSPADM

## 2024-02-02 RX ORDER — TERBUTALINE SULFATE 1 MG/ML
0.25 INJECTION, SOLUTION SUBCUTANEOUS AS NEEDED
Status: DISCONTINUED | OUTPATIENT
Start: 2024-02-02 | End: 2024-02-03 | Stop reason: HOSPADM

## 2024-02-02 RX ORDER — ACETAMINOPHEN 500 MG
500 TABLET ORAL EVERY 6 HOURS PRN
Status: DISCONTINUED | OUTPATIENT
Start: 2024-02-02 | End: 2024-02-03 | Stop reason: HOSPADM

## 2024-02-02 RX ORDER — ACETAMINOPHEN 500 MG
1000 TABLET ORAL EVERY 6 HOURS PRN
Status: DISCONTINUED | OUTPATIENT
Start: 2024-02-02 | End: 2024-02-03 | Stop reason: HOSPADM

## 2024-02-02 RX ORDER — CITRIC ACID/SODIUM CITRATE 334-500MG
30 SOLUTION, ORAL ORAL AS NEEDED
Status: DISCONTINUED | OUTPATIENT
Start: 2024-02-02 | End: 2024-02-03 | Stop reason: HOSPADM

## 2024-02-02 RX ORDER — VANCOMYCIN 2 GRAM/500 ML IN 0.9 % SODIUM CHLORIDE INTRAVENOUS
20 EVERY 8 HOURS
Status: DISCONTINUED | OUTPATIENT
Start: 2024-02-02 | End: 2024-02-03 | Stop reason: HOSPADM

## 2024-02-02 RX ORDER — SODIUM CHLORIDE, SODIUM LACTATE, POTASSIUM CHLORIDE, CALCIUM CHLORIDE 600; 310; 30; 20 MG/100ML; MG/100ML; MG/100ML; MG/100ML
INJECTION, SOLUTION INTRAVENOUS CONTINUOUS
Status: DISCONTINUED | OUTPATIENT
Start: 2024-02-02 | End: 2024-02-03 | Stop reason: HOSPADM

## 2024-02-02 RX ORDER — IBUPROFEN 600 MG/1
600 TABLET ORAL ONCE AS NEEDED
Status: DISCONTINUED | OUTPATIENT
Start: 2024-02-02 | End: 2024-02-03 | Stop reason: HOSPADM

## 2024-02-03 ENCOUNTER — ANESTHESIA (OUTPATIENT)
Dept: OBGYN UNIT | Facility: HOSPITAL | Age: 29
End: 2024-02-03
Payer: MEDICAID

## 2024-02-03 ENCOUNTER — ANESTHESIA EVENT (OUTPATIENT)
Dept: OBGYN UNIT | Facility: HOSPITAL | Age: 29
End: 2024-02-03
Payer: MEDICAID

## 2024-02-03 PROCEDURE — 3E033VJ INTRODUCTION OF OTHER HORMONE INTO PERIPHERAL VEIN, PERCUTANEOUS APPROACH: ICD-10-PCS | Performed by: STUDENT IN AN ORGANIZED HEALTH CARE EDUCATION/TRAINING PROGRAM

## 2024-02-03 PROCEDURE — 59409 OBSTETRICAL CARE: CPT | Performed by: STUDENT IN AN ORGANIZED HEALTH CARE EDUCATION/TRAINING PROGRAM

## 2024-02-03 PROCEDURE — 0HQ9XZZ REPAIR PERINEUM SKIN, EXTERNAL APPROACH: ICD-10-PCS | Performed by: STUDENT IN AN ORGANIZED HEALTH CARE EDUCATION/TRAINING PROGRAM

## 2024-02-03 RX ORDER — DOCUSATE SODIUM 100 MG/1
100 CAPSULE, LIQUID FILLED ORAL
Status: DISCONTINUED | OUTPATIENT
Start: 2024-02-03 | End: 2024-02-04

## 2024-02-03 RX ORDER — BISACODYL 10 MG
10 SUPPOSITORY, RECTAL RECTAL ONCE AS NEEDED
Status: DISCONTINUED | OUTPATIENT
Start: 2024-02-03 | End: 2024-02-04

## 2024-02-03 RX ORDER — BUPIVACAINE HYDROCHLORIDE 2.5 MG/ML
INJECTION, SOLUTION EPIDURAL; INFILTRATION; INTRACAUDAL AS NEEDED
Status: DISCONTINUED | OUTPATIENT
Start: 2024-02-03 | End: 2024-02-03 | Stop reason: SURG

## 2024-02-03 RX ORDER — BUPIVACAINE HCL/0.9 % NACL/PF 0.25 %
5 PLASTIC BAG, INJECTION (ML) EPIDURAL AS NEEDED
Status: DISCONTINUED | OUTPATIENT
Start: 2024-02-03 | End: 2024-02-04

## 2024-02-03 RX ORDER — ACETAMINOPHEN 500 MG
1000 TABLET ORAL EVERY 6 HOURS PRN
Status: DISCONTINUED | OUTPATIENT
Start: 2024-02-03 | End: 2024-02-04

## 2024-02-03 RX ORDER — IBUPROFEN 600 MG/1
600 TABLET ORAL EVERY 6 HOURS
Status: DISCONTINUED | OUTPATIENT
Start: 2024-02-03 | End: 2024-02-04

## 2024-02-03 RX ORDER — NALBUPHINE HYDROCHLORIDE 10 MG/ML
2.5 INJECTION, SOLUTION INTRAMUSCULAR; INTRAVENOUS; SUBCUTANEOUS
Status: DISCONTINUED | OUTPATIENT
Start: 2024-02-03 | End: 2024-02-04

## 2024-02-03 RX ORDER — ACETAMINOPHEN 500 MG
500 TABLET ORAL EVERY 6 HOURS PRN
Status: DISCONTINUED | OUTPATIENT
Start: 2024-02-03 | End: 2024-02-04

## 2024-02-03 RX ORDER — SIMETHICONE 80 MG
80 TABLET,CHEWABLE ORAL 3 TIMES DAILY PRN
Status: DISCONTINUED | OUTPATIENT
Start: 2024-02-03 | End: 2024-02-04

## 2024-02-03 RX ORDER — ENOXAPARIN SODIUM 100 MG/ML
40 INJECTION SUBCUTANEOUS DAILY
Status: DISCONTINUED | OUTPATIENT
Start: 2024-02-03 | End: 2024-02-04

## 2024-02-03 RX ORDER — LIDOCAINE HYDROCHLORIDE AND EPINEPHRINE 15; 5 MG/ML; UG/ML
INJECTION, SOLUTION EPIDURAL AS NEEDED
Status: DISCONTINUED | OUTPATIENT
Start: 2024-02-03 | End: 2024-02-03 | Stop reason: SURG

## 2024-02-03 RX ADMIN — BUPIVACAINE HYDROCHLORIDE 5 ML: 2.5 INJECTION, SOLUTION EPIDURAL; INFILTRATION; INTRACAUDAL at 03:15:00

## 2024-02-03 RX ADMIN — LIDOCAINE HYDROCHLORIDE AND EPINEPHRINE 3 ML: 15; 5 INJECTION, SOLUTION EPIDURAL at 02:39:00

## 2024-02-03 NOTE — ANESTHESIA PROCEDURE NOTES
Labor Analgesia    Date/Time: 2/3/2024 2:21 AM    Performed by: Amanuel Lomeli DO  Authorized by: Amanuel Lomeli DO      General Information and Staff    Start Time:  2/3/2024 2:21 AM  End Time:  2/3/2024 2:39 AM  Anesthesiologist:  Amanuel Lomeli DO  Performed by:  Anesthesiologist  Patient Location:  OB  Site Identification: surface landmarks    Reason for Block: labor epidural    Preanesthetic Checklist: patient identified, IV checked, site marked, risks and benefits discussed, monitors and equipment checked, pre-op evaluation, timeout performed, IV bolus, anesthesia consent and sterile technique used      Procedure Details    Patient Position:  Sitting  Prep: ChloraPrep    Monitoring:  Heart rate and continuous pulse ox  Approach:  Midline    Epidural Needle    Injection Technique:  ERASMO air  Needle Type:  Tuohy  Needle Gauge:  17 G  Needle Length:  3.375 in  Location:  L3-4    Spinal Needle      Catheter    Catheter Type:  End hole  Catheter Size:  19 G  Test Dose:  Negative    Assessment    Sensory Level:  T10    Additional Comments         Sterile technique  Chloraprep skin prep  1% Lidocaine skin infiltration  Negative for CSF, heme, and paresthesias.    3mL 1.5% Lidocaine with EPI test dose.  Test Dose negative for CV/CNS effects  100 mcg fentanyl given via epidural.  5mL epidural bolus of 0.125% Bupivicaine with 2mcg/mL fentanyl followed by 12mL/hr epidural infusion.    No apparent anesthetic complications.

## 2024-02-03 NOTE — PLAN OF CARE
Problem: BIRTH - VAGINAL/ SECTION  Goal: Fetal and maternal status remain reassuring during the birth process  Description: INTERVENTIONS:  - Monitor vital signs  - Monitor fetal heart rate  - Monitor uterine activity  - Monitor labor progression (vaginal delivery)  - DVT prophylaxis (C/S delivery)  - Surgical antibiotic prophylaxis (C/S delivery)  Outcome: Progressing     Problem: PAIN - ADULT  Goal: Verbalizes/displays adequate comfort level or patient's stated pain goal  Description: INTERVENTIONS:  - Encourage pt to monitor pain and request assistance  - Assess pain using appropriate pain scale  - Administer analgesics based on type and severity of pain and evaluate response  - Implement non-pharmacological measures as appropriate and evaluate response  - Consider cultural and social influences on pain and pain management  - Manage/alleviate anxiety  - Utilize distraction and/or relaxation techniques  - Monitor for opioid side effects  - Notify MD/LIP if interventions unsuccessful or patient reports new pain  - Anticipate increased pain with activity and pre-medicate as appropriate  Outcome: Progressing     Problem: ANXIETY  Goal: Will report anxiety at manageable levels  Description: INTERVENTIONS:  - Administer medication as ordered  - Teach and rehearse alternative coping skills  - Provide emotional support with 1:1 interaction with staff  Outcome: Progressing     Problem: Patient/Family Goals  Goal: Patient/Family Long Term Goal  Description: Patient's Long Term Goal:Uncomplicated vaginal delivery    Interventions:  VS per protocol  I&O  Ice chips and sips as tolerated  EFM per protocol  Maintain IV as ordered  Antibiotics as needed per protocol  Informed consent      Interventions:  - See additional Care Plan goals for specific interventions  Outcome: Progressing  Goal: Patient/Family Short Term Goal  Description: Patient's Short Term Goal:     Interventions:   -- See additional Care Plan goals  for specific interventions  2/2/2024 2016 by Rochelle Tirado, RN  Outcome: Progressing  2/2/2024 2014 by Rochelle Tirado, RN  Note: Adequate pain control with delivery of infant  Interventions:  Pain assessment scores as ordered  Patient scores pain a \"3\" or less  Multidisciplinary care   Nonpharmacologic comfort measures

## 2024-02-03 NOTE — PROGRESS NOTES
Pt is a 28 year old female admitted to [facility].     Chief Complaint   Patient presents with    Scheduled Induction     Elective Induction of Labor      Pt is  40w2d intra-uterine pregnancy.  History obtained, consents signed. Oriented to room, staff, and plan of care.

## 2024-02-03 NOTE — PLAN OF CARE
Problem: BIRTH - VAGINAL/ SECTION  Goal: Fetal and maternal status remain reassuring during the birth process  Description: INTERVENTIONS:  - Monitor vital signs  - Monitor fetal heart rate  - Monitor uterine activity  - Monitor labor progression (vaginal delivery)  - DVT prophylaxis (C/S delivery)  - Surgical antibiotic prophylaxis (C/S delivery)  2/3/2024 0722 by Rochelle Tirado RN  Outcome: Completed  2024 by Rochelle Tirado RN  Outcome: Progressing  2024 by Rochelle Tirado RN  Outcome: Progressing     Problem: PAIN - ADULT  Goal: Verbalizes/displays adequate comfort level or patient's stated pain goal  Description: INTERVENTIONS:  - Encourage pt to monitor pain and request assistance  - Assess pain using appropriate pain scale  - Administer analgesics based on type and severity of pain and evaluate response  - Implement non-pharmacological measures as appropriate and evaluate response  - Consider cultural and social influences on pain and pain management  - Manage/alleviate anxiety  - Utilize distraction and/or relaxation techniques  - Monitor for opioid side effects  - Notify MD/LIP if interventions unsuccessful or patient reports new pain  - Anticipate increased pain with activity and pre-medicate as appropriate  2/3/2024 0722 by Rochelle Tirado RN  Outcome: Completed  2024 by Rochelle Tirado RN  Outcome: Progressing  2024 by Rochelle Tirado RN  Outcome: Progressing     Problem: ANXIETY  Goal: Will report anxiety at manageable levels  Description: INTERVENTIONS:  - Administer medication as ordered  - Teach and rehearse alternative coping skills  - Provide emotional support with 1:1 interaction with staff  2/3/2024 0722 by Rochelle Tirado RN  Outcome: Completed  2024 by Rochelle Tirado RN  Outcome: Progressing  2024 by Rochelle Tirado RN  Outcome: Progressing     Problem: Patient/Family Goals  Goal: Patient/Family Long  Term Goal  Description: Patient's Long Term Goal:Uncomplicated vaginal delivery    Interventions:  VS per protocol  I&O  Ice chips and sips as tolerated  EFM per protocol  Maintain IV as ordered  Antibiotics as needed per protocol  Informed consent      Interventions:  - See additional Care Plan goals for specific interventions  2/3/2024 0722 by Rochelle Tirado, RN  Outcome: Completed  2/2/2024 2017 by Rochelle Tirado, RN  Outcome: Progressing  2/2/2024 2016 by Rochelle Tirado, RN  Outcome: Progressing  Goal: Patient/Family Short Term Goal  Description: Patient's Short Term Goal:    Interventions:     - See additional Care Plan goals for specific interventions  2/3/2024 0722 by Rochelle Tirado, RN  Outcome: Completed  2/2/2024 2017 by Rochelle Tirado, RN  Outcome: Progressing  2/2/2024 2016 by Rochelle Tirado, RN  Outcome: Progressing  2/2/2024 2014 by Rochelle Tirado, RN  Note: Adequate pain control with delivery of infant  Interventions:  Pain assessment scores as ordered  Patient scores pain a \"3\" or less  Multidisciplinary care   Nonpharmacologic comfort measures

## 2024-02-03 NOTE — L&D DELIVERY NOTE
Vinicius Ortiz [[id number]]      Labor Events     labor?: No   steroids?: None  Antibiotics received during labor?: Yes  Antibiotics (enter # doses in comment): vancomycin  Rupture date/time: 2/3/2024 0145     Rupture type: SROM  Fluid color: Clear       Labor Event Times    Labor onset date/time: 2/3/2024 0156  Dilation complete date/time: 2/3/2024 0458  Start pushing date/time: 2/3/2024 0459       [place] Presentation    Presentation: Vertex  Position: Occiput Posterior       Operative Delivery    Operative Vaginal Delivery: No                      Shoulder Dystocia    Shoulder Dystocia: No             Anesthesia    Method: Epidural               Delivery      Head delivery date/time: 2/3/2024 05:25:33   Delivery date/time:  2/3/24 05:26:44   Delivery type: Normal spontaneous vaginal delivery    Details:     Delivery location: delivery room       Delivery Providers    Delivering Clinician: Martha Yee MD   Delivery personnel:  Provider Role   Jazmyn Wong, RN Baby Nurse   Rochelle Tirado RN Delivery Nurse             Cord    Complications: Nuchal       Resuscitation    Method: None        Measurements      Weight: 3714 g 8 lb 3 oz Length: 53.3 cm                       Placenta    Date/time: 2/3/2024 0531  Removal: Spontaneous  Appearance: Intact       Apgars    Living status: Living   Apgar Scoring Key:    0 1 2    Skin color Blue or pale Acrocyanotic Completely pink    Heart rate Absent <100 bpm >100 bpm    Reflex irritability No response Grimace Cry or active withdrawal    Muscle tone Limp Some flexion Active motion    Respiratory effort Absent Weak cry; hypoventilation Good, crying              1 Minute:  5 Minute:  10 Minute:  15 Minute:  20 Minute:      Skin color: 0  1       Heart rate: 2  2       Reflex irritablity: 2  2       Muscle tone: 2  2       Respiratory effort: 2  2       Total: 8  9          Apgars assigned by: JAZMYN WNOG  [place]  disposition:  nursery       Skin to Skin    Skin to skin initiated date/time: 2/3/2024       Vaginal Count    Initial count RN: Rochelle Tirado RN  Initial count Tech: Jodi Ca    Initial counts        Final counts               Delivery (Maternal)    Episiotomy: None  Perineal lacerations: None, 1st Repaired?: Yes     Labial laceration: right      Vaginal laceration?: No      Cervical laceration?: No    Clitoral laceration?: No                  Vaginal Delivery Note    Induction of labor was achieved with IV pitocin.  After spontaneous rupture of membranes w clear fluid, epidural was used for pain management.  Patient progressed to completely dilated and started pushing at 0459.  With good maternal effort, patient delivered baby in occiput posterior position at 0526.  Nuchal x1.  IV pitocin bolus was given.  Within 30 minutes of delivery, placenta was delivered with gentle traction.  1st degree laceration was repaired with a single figure of 8 of 3-0 vicryl.  Right labial laceration was fixed with a figure of eight.  All instruments and sponges were counted.  Fundus was firm and bleeding minimal when provider left the room.

## 2024-02-03 NOTE — PROGRESS NOTES
Patient up to bathroom with assist x 1  Unable to void at this time. Patient transferred to mother/baby room 2208 per wheelchair in stable condition with baby and personal belongings.  Accompanied by significant other and staff.  Report given to mother/baby CRISTÓBAL Fleming.

## 2024-02-03 NOTE — ANESTHESIA PREPROCEDURE EVALUATION
PRE-OP EVALUATION    Patient Name: Mary Ortiz    Admit Diagnosis: pregnancy  Pregnancy    Pre-op Diagnosis: * No pre-op diagnosis entered *        Anesthesia Procedure: LABOR ANALGESIA    * No surgeons found in log *    Pre-op vitals reviewed.  Temp: 97.9 °F (36.6 °C)  Pulse: 74  Resp: 20  BP: 152/93  SpO2: 97 %  Body mass index is 33.2 kg/m².    Current medications reviewed.  Hospital Medications:   lactated ringers IV bolus 1,000 mL  1,000 mL Intravenous Once    fentaNYL-bupivacaine 2 mcg/mL-0.125% in sodium chloride 0.9% 100 mL EPIDURAL infusion premix  12 mL/hr Epidural Continuous    fentaNYL (Sublimaze) 50 mcg/mL injection 100 mcg  100 mcg Epidural Once    EPHEDrine (PF) 25 MG/5 ML injection 5 mg  5 mg Intravenous PRN    nalbuphine (Nubain) 10 mg/mL injection 2.5 mg  2.5 mg Intravenous Q15 Min PRN    fentaNYL (Sublimaze) 50 mcg/mL injection        fentaNYL-bupivacaine in sodium chloride 0.9% 2 mcg/mL-0.125% EPIDURAL infusion premix        lactated ringers infusion   Intravenous Continuous    dextrose in lactated ringers 5% infusion   Intravenous PRN    lactated ringers IV bolus 500 mL  500 mL Intravenous PRN    acetaminophen (Tylenol Extra Strength) tab 500 mg  500 mg Oral Q6H PRN    acetaminophen (Tylenol Extra Strength) tab 1,000 mg  1,000 mg Oral Q6H PRN    ibuprofen (Motrin) tab 600 mg  600 mg Oral Once PRN    ondansetron (Zofran) 4 MG/2ML injection 4 mg  4 mg Intravenous Q6H PRN    oxyTOCIN in sodium chloride 0.9% (Pitocin) 30 Units/500mL infusion premix  62.5-900 guillermo-units/min Intravenous Continuous    terbutaline (Brethine) 1 MG/ML injection 0.25 mg  0.25 mg Subcutaneous PRN    sodium citrate-citric acid (Bicitra) 500-334 MG/5ML oral solution 30 mL  30 mL Oral PRN    vancomycin (Vancocin) 2 g in sodium chloride 0.9% 500mL IVPB premix  20 mg/kg Intravenous Q8H    oxyTOCIN in sodium chloride 0.9% (Pitocin) 30 Units/500mL infusion premix  0.5-20 guillermo-units/min Intravenous Continuous       Outpatient  Medications:     Medications Prior to Admission   Medication Sig Dispense Refill Last Dose    Prenatal MV & Min w/FA-DHA ([facility]) Take 1 tablet by mouth daily.   2024 at 1030       Allergies: Amoxicillin, Haloperidol, and Penicillins      Anesthesia Evaluation    Patient summary reviewed.    Anesthetic Complications  (-) history of anesthetic complications         GI/Hepatic/Renal  Comment: Chronic hepatitis C                (+) hepatitis               Cardiovascular  Comment: Patient denies hx of chest pain/tightness, MI, or cardiac disease.        Exercise tolerance: good     MET: >4                                           Endo/Other                                  Pulmonary    Negative pulmonary ROS.  (-) asthma         (-) shortness of breath     (-) sleep apnea       Neuro/Psych                              Hx of intravenous drug use in remission - patient denies using any drugs currently    No history of anticoagulant use or bleeding diatheses.          History reviewed. No pertinent surgical history.  Social History     Socioeconomic History    Marital status: Single   Tobacco Use    Smoking status: Former     Packs/day: .5     Types: Cigarettes     Quit date: 2021     Years since quittin.4    Smokeless tobacco: Never   Vaping Use    Vaping Use: Never used   Substance and Sexual Activity    Alcohol use: Not Currently    Drug use: Not Currently     Types: IV, Heroin     Comment: Quit heroin 2020    Sexual activity: Yes     Partners: Male     History   Drug Use Unknown     Comment: Quit heroin 2020     Available pre-op labs reviewed.  Lab Results   Component Value Date    WBC 9.9 2024    RBC 4.05 2024    HGB 12.2 2024    HCT 37.3 2024    MCV 92.1 2024    MCH 30.1 2024    MCHC 32.7 2024    RDW 13.6 2024    .0 2024               Airway      Mallampati: II  Mouth opening: >3 FB  TM distance: 4 - 6 cm  Neck ROM:  full Cardiovascular    Cardiovascular exam normal.  Rhythm: regular  Rate: normal     Dental    Dentition appears grossly intact         Pulmonary    Pulmonary exam normal.  Breath sounds clear to auscultation bilaterally.               Other findings              ASA: 2   Plan: epidural  NPO status verified and     Post-procedure pain management plan discussed with surgeon and patient.      Plan/risks discussed with: patient                Present on Admission:  **None**

## 2024-02-03 NOTE — PROGRESS NOTES
Labor progress note    S/p epidural.  9/100/-1  SROMc  Cat II tracing but reassurin/mod/+accels/intermittent variables.  Contractions q2-3 min  Pitocin at 4    Patient trying to get comfortable before she starts pushing.  RN and other staff aware that she is Hep C VL positive and will wear appropriate PPE.

## 2024-02-03 NOTE — H&P
[facility]    History & Physical    Mary Ortiz Patient Status:  Inpatient    1995 MRN [id number]   Location [facility] LABOR & DELIVERY Attending Martha Yee,*   Hosp Day # 0 PCP None Pcp     Date of Admission:  2024    HPI:   Mary Ortiz is a 28 year old  female, current EGA of 40w2d with an estimated date of delivery of: Estimated Date of Delivery: 24      Mary Ortiz is being admitted for induction of labor.    Her current obstetrical history is significant for hepatitis C with positivve viral load, group B strep positive, MMR non immune, history of IV heroin use (last in ).  Denies contractions, LOF, VB or decreased fetal movement.     Denies HA, vision changes, chest pain, SOB, RUQ pain, new edema.      History   Obstetric History:   OB History    Para Term  AB Living   2 1 1     1   SAB IAB Ectopic Multiple Live Births           1      # Outcome Date GA Lbr Gurpreet/2nd Weight Sex Delivery Anes PTL Lv   2 Current            1 Term 03/15/22 38w3d 33:50 / 01:14 7 lb 13.2 oz (3.55 kg) M Vag-Spont EPI N URSZULA      Obstetric Comments   3/15/22 - denies hypertension/pre-eclampsia, GDM, birth complications. +COVID-19 during pregnancy. Placental path: Maternal vascular malperfusion, low-grade.     Past Medical History:   Past Medical History:   Diagnosis Date    Acute drug intoxication with delirium (HCC) 2020    history of substance abuse. She was found unresponsive in a parking lot and was taken to a nearby motel. She received 4 mg Narcan intranasal by EMS and almost immediately became combative. Ammonia elevated at 82. CT head neg. 2020 UDS +amphetamine, +cocaine metabolite, +opiates, +cannabinoid. Was given Haldol. Left AMA    Acute pancreatitis 2012    Chronic constipation     BM every 2 days prior to - pregnancy    Chronic hepatitis C (HCC) 10/12/2023    Hepatitis C test positive 2021    Formatting of this note is different from  the original. Referral placed for GI. 1) Plan from GI: f/u 3 months PP, 6 months PP with Dr. Daniels 2) [facility] recommends consult:  Recommendations   1. Every 4 week growth scans:    -:  32w2d EFW: 1751g Growth 60%   -2/15: 35w2d, Growth 52%, 2773g    -3/22:    2. Weekly NSTs beginning at 36 weeks gestation:   3. Hepatology consultation: : attended with    Heroin abuse (HCC)     Injected heroin for -2019    History of intravenous drug use in remission     heroin IV -2019    History of tobacco use     Irregular menses     never really predictable, more irregular after recovering from IV heroin use.    Pap smear for cervical cancer screening 2022    Pap negative - Jennifer Grant, PORSCHE[facility]LLIE. No h/o abn pap     Past Social History: History reviewed. No pertinent surgical history.  Family History:   Family History   Problem Relation Age of Onset    Cancer Maternal Grandmother         stomach    Breast Cancer Neg     Ovarian Cancer Neg     Colon Cancer Neg     Diabetes Neg     Thyroid disease Neg     Birth Defects Neg     Genetic Disease Neg      Social History:   Social History     Tobacco Use    Smoking status: Former     Packs/day: .5     Types: Cigarettes     Quit date: 2021     Years since quittin.4    Smokeless tobacco: Never   Substance Use Topics    Alcohol use: Not Currently        Allergies/Medications:   Allergies:   Allergies   Allergen Reactions    Amoxicillin ANAPHYLAXIS and HIVES    Haloperidol Tightness in Throat     agitation    Penicillins ANAPHYLAXIS     Medications:  Medications Prior to Admission   Medication Sig Dispense Refill Last Dose    Prenatal MV & Min w/FA-DHA ([facility] OR) Take 1 tablet by mouth daily.   2024 at 1030       Review of Systems:   As documented in HPI, otherwise negative    Physical Exam:   Temp:  [98.1 °F (36.7 °C)] 98.1 °F (36.7 °C)  Pulse:  [75] 75  Resp:  [18] 18  BP: (130)/(86) 130/86  SpO2:  [98 %] 98  %    Constitutional: well appearing  Respiratory: no increased WOB, not using accessory muscles  Cardiac: regular rate  Abdomen: nontender  SVE: 4/50/-3. Intact  Cat 1 : 130/mod/+accels/no decels    Results:     Lab Results   Component Value Date    TREPONEMALAB Nonreactive 02/02/2024    RAPIDHIVSCRN Negative 12/22/2021    ABO O 02/02/2024    RH Positive 02/02/2024    WBC 9.9 02/02/2024    HGB 12.2 02/02/2024    HCT 37.3 02/02/2024    .0 02/02/2024    CREATSERUM 0.63 10/10/2023    BUN 8 10/10/2023     (L) 10/10/2023    K 3.9 10/10/2023     10/10/2023    CO2 23.0 10/10/2023    GLU 84 10/10/2023    CA 8.9 10/10/2023    ALB 3.0 (L) 10/10/2023    ALKPHO 52 10/10/2023    BILT 0.3 10/10/2023    TP 7.7 10/10/2023    AST 7 (L) 10/10/2023    ALT 15 10/10/2023     11/07/2017       Lab Results   Component Value Date    COLORUR Yellow 11/05/2021    CLARITY Clear 11/05/2021    SPECGRAVITY >=1.030 11/05/2021    PROUR Negative 11/05/2021    GLUUR Negative 11/05/2021    KETUR Negative 11/05/2021    BILUR Negative 11/05/2021    BLOODURINE Negative 11/05/2021    NITRITE Negative 11/05/2021    UROBILINOGEN 0.2 11/05/2021    LEUUR Negative 11/05/2021       [unfilled]    BSUS:    Assessment/Plan:    Mary Ortiz is at an estimated gestational age of 40w2d with an estimated date of delivery of:  Estimated Date of Delivery: 1/31/24      IOL: SVE on admit 4/50/-3.  Plan pitocin.  Will avoid AROM, internals unless medically necessary due to hep C +VL status.    FWB  - CEFM: Cat 1   - Normal anatomy w limited cardiac views and nl level 2 scan  - Presentation:VTX    GBS positive:   [ ] vanc due to allergies     Maternal co-morbidities  - Hep C: +VL 10/10  u/mL  [ ] plan hep C VL post partum  [ ] Treatment with Dr. Jonathon Croft hepatologist  [ ] Ok to breastfeed but not if nipples cracked/bleeding    - Rubella nonimmune:   [ ] Plan MMR post partum    - History of IV heroin use: last use in 2019, stable      FEN/Ppx  -Regular diet  -Ambulation    Risks, benefits, alternatives and possible complications have been discussed in detail with the patient.   Pre-admission, admission, and post admission procedures and expectations were discussed in detail.    All questions answered, all appropriate consents will be signed at the Hospital. Admission is planned for today.       Martha Yee MD  2/2/2024  11:14 PM    Note to patient and family   The 21st Century Cures Act makes medical notes available to patients in the interest of transparency.  However, please be advised that this is a medical document.  It is intended as [id number] communication.  It is written and medical language may contain abbreviations or verbiage that are technical and unfamiliar.  It may appear blunt or direct.  Medical documents are intended to carry relevant information, facts as evident, and the clinical opinion of the practitioner.

## 2024-02-03 NOTE — PROGRESS NOTES
[facility] Dosing Service    Vancomycin for GBS Prophylaxis (Patient with Penicillin Allergy)  Mary Ortiz is a 28 year old patient who has been prescribed vancomycin 20mg/kg IV every 8 hours for GBS prophylaxis.  Pharmacy will monitor and will order vancomycin levels if vancomycin is continued beyond 48 hours.    Hyacinth Stanley, PharmD  2/2/2024  8:39 PM  [facility]: [phone]

## 2024-02-04 VITALS
WEIGHT: 205.63 LBS | OXYGEN SATURATION: 95 % | HEIGHT: 65.98 IN | RESPIRATION RATE: 16 BRPM | DIASTOLIC BLOOD PRESSURE: 83 MMHG | TEMPERATURE: 98 F | SYSTOLIC BLOOD PRESSURE: 114 MMHG | HEART RATE: 51 BPM | BODY MASS INDEX: 33.05 KG/M2

## 2024-02-04 LAB
BASOPHILS # BLD AUTO: 0.02 X10(3) UL (ref 0–0.2)
BASOPHILS NFR BLD AUTO: 0.2 %
EOSINOPHIL # BLD AUTO: 0.09 X10(3) UL (ref 0–0.7)
EOSINOPHIL NFR BLD AUTO: 1 %
ERYTHROCYTE [DISTWIDTH] IN BLOOD BY AUTOMATED COUNT: 13.6 %
HCT VFR BLD AUTO: 32.6 %
HGB BLD-MCNC: 10.5 G/DL
IMM GRANULOCYTES # BLD AUTO: 0.05 X10(3) UL (ref 0–1)
IMM GRANULOCYTES NFR BLD: 0.5 %
LYMPHOCYTES # BLD AUTO: 2 X10(3) UL (ref 1–4)
LYMPHOCYTES NFR BLD AUTO: 21.2 %
MCH RBC QN AUTO: 30.6 PG (ref 26–34)
MCHC RBC AUTO-ENTMCNC: 32.2 G/DL (ref 31–37)
MCV RBC AUTO: 95 FL
MONOCYTES # BLD AUTO: 0.8 X10(3) UL (ref 0.1–1)
MONOCYTES NFR BLD AUTO: 8.5 %
NEUTROPHILS # BLD AUTO: 6.49 X10 (3) UL (ref 1.5–7.7)
NEUTROPHILS # BLD AUTO: 6.49 X10(3) UL (ref 1.5–7.7)
NEUTROPHILS NFR BLD AUTO: 68.6 %
PLATELET # BLD AUTO: 169 10(3)UL (ref 150–450)
RBC # BLD AUTO: 3.43 X10(6)UL
WBC # BLD AUTO: 9.5 X10(3) UL (ref 4–11)

## 2024-02-04 RX ORDER — IBUPROFEN 600 MG/1
600 TABLET ORAL EVERY 6 HOURS
Qty: 40 TABLET | Refills: 0 | Status: SHIPPED | OUTPATIENT
Start: 2024-02-04

## 2024-02-04 NOTE — LACTATION NOTE
LACTATION NOTE - MOTHER      Evaluation Type: Inpatient    Problems identified  Problems identified: Knowledge deficit  Problems Identified Other: Maternal Hepatitis C - with excoriated nipples- some redness noted on right nipple - slight escoriation    Maternal history  Maternal history: Induction of labor  Other/comment: h/o or Hepatitis C  Drug use/Heroin use - last use 2019    Breastfeeding goal  Breastfeeding goal: To maintain breast milk feeding per patient goal    Maternal Assessment  Bilateral Breasts: Soft;Symmetrical  Bilateral Nipples: Slightly everted/short  Right Nipple: Pink;Abrasion;Colostrum difficult to express (slightly pink - escoriation stripe)  Left Nipple: Colostrum difficult to express;Slightly everted/short  Prior breastfeeding experience (comment below): Multip;Problems, continued  Prior BF experience: comment: States her first child had some sensory eating issues and she pumped and bottle fed for 3 months. Hoping to be able to latch this baby and provide breast milk longer.  Breastfeeding Assistance: Breast exam provided with permission    Pain assessment  Location/Comment: notes soreness on the face of the right nipple but does not c/o pain  Treatment of Sore Nipples: Coconut oil    Guidelines for use of:  Equipment: Hydrogel dressings  Breast pump type: Ameda Platinum;Other (MEDLEA Swin - also has a Medela pump in style but needs to purchase replacment parts - Enc to Rent or use a double electric breast pump for best results initiating milk supply)  Current use of pump:: To pump and dump due to escoriation / possible infant exposure Hepatitis C positive per the CDC guidelines  Suggested use of pump: Pump 8-12X/24hr;Pump each time a supplement is offered;For comfort as needed  Reported pumping volumes (ml): gtt  Other (comment): Pumping  irregularly for escoriated right nipple stripe due to maternal hepatiitis C status and potential blood exposure.  CDC Written guidelines jennifer for  breastfeeding cesation ( while protecting maternal milk supply)  when Hepatiitis C posive mothers have bleeding from the nipples. Continued plan to pump and dump and supplement with formula until LC OPV 2-9-24 @ 12:30 - to call if desires an earlier evaluation - mother vebalizes understaning of temporary feeding plan. Hydrogels provided for nipple healing- enc manual massage and expression - reviewed pacing infant bottle feedings. Discussed possible NS trial at the [facility] if needed.

## 2024-02-04 NOTE — CM/SW NOTE
[facility] order received for substance abuse risk.  Spoke with pt who admits to heroin use in her past but says she has been clean and sober since 2020.  She says she chooses to no longer uses drugs or alcohol.  Pt says she has moved from her previous place of residence in [place] where she was using to [place] and now she lives in [place].  She lives with her boyfriend and two year old son and will dc home with her  son.  Pt sounded happy and was appreciative for the call.  Pt not needing any additional resources at this time.  Pt will dc home today.

## 2024-02-04 NOTE — PROGRESS NOTES
PPD#1  Patient has no complaints, lochia minimal    /83 (BP Location: Left arm)   Pulse 51   Temp 98.2 °F (36.8 °C) (Oral)   Resp 16   Ht 65.98\"   Wt 205 lb 9.6 oz (93.3 kg)   LMP 2023 (Approximate)   SpO2 95%   Breastfeeding Yes   BMI 33.20 kg/m²     Recent Labs   Lab 24  0541   RBC 4.05 3.43*   HGB 12.2 10.5*   HCT 37.3 32.6*   MCV 92.1 95.0   MCH 30.1 30.6   MCHC 32.7 32.2   RDW 13.6 13.6   NEPRELIM 7.48 6.49   WBC 9.9 9.5   .0 169.0       Abdomen: soft, NT/ND  Uterus: firm, below umbilicus    A/P: s/p   - doing well  - home today

## 2024-02-04 NOTE — PROGRESS NOTES
Labor Analgesia Follow Up Note    Patient underwent epidural anesthesia for labor analgesia,    Placenta Date/Time: 2/3/2024  5:31 AM     Delivery Date/Time:: 2/3/2024   5:26 AM     /74 (BP Location: Left arm)   Pulse 62   Temp 98.5 °F (36.9 °C) (Oral)   Resp 18   Ht 1.676 m (5' 5.98\")   Wt 93.3 kg (205 lb 9.6 oz)   LMP 05/05/2023 (Approximate)   SpO2 95%   Breastfeeding Yes   BMI 33.20 kg/m²     Assessment:  Patient seen and no apparent anesthesia related complications.    Thank you for asking us to participate in the care of your patient.

## 2024-02-05 ENCOUNTER — PATIENT OUTREACH (OUTPATIENT)
Dept: CASE MANAGEMENT | Age: 29
End: 2024-02-05

## 2024-02-05 NOTE — PROGRESS NOTES
OBGYN Post Partum apt request (delivered 02/03)    [facility]oles  [facility]  [address] [place] [address] [place] [address]  [phone]  Follow up 6 weeks  Apt made:  Tue 03/19 @1:30pm - Stephie  Confirmed w/pt  Closing encounter

## 2024-02-06 ENCOUNTER — TELEPHONE (OUTPATIENT)
Dept: OBGYN UNIT | Facility: HOSPITAL | Age: 29
End: 2024-02-06

## 2024-02-07 LAB
HCV LOG10 2: 5.05 LOG10 IU/ML
HEP C GENOTYPE 2: 3
HEP C QN 2: NORMAL IU/ML

## 2024-02-09 ENCOUNTER — NURSE ONLY (OUTPATIENT)
Dept: LACTATION | Facility: HOSPITAL | Age: 29
End: 2024-02-09
Attending: PHYSICIAN ASSISTANT
Payer: MEDICAID

## 2024-02-09 PROCEDURE — 99213 OFFICE O/P EST LOW 20 MIN: CPT

## 2024-02-09 NOTE — PATIENT INSTRUCTIONS
Guidelines for Using a Nipple Shield    Refer to the ’s instructions. These are additional suggestions only.  This thin silicone nipple shield (size 20mm) has been recommended to assist your baby to latch on to the breast or for protection of your nipples while your baby learns to breastfeed correctly.  Use of the shield is considered temporary, allowing you to begin breastfeeding your baby. Some infants have  successfully using the shield for longer periods, however, checking your infant’s weight regularly is recommended to assure adequate growth while using the nipple shield.  Until latching with a nipple shield becomes easier- you can choose to pump and bottle feed only at night or every other feeding. Important goals are emptying your breasts regularly and Joseph getting fed every 3 hours. Today he drank 40 mls from breast total (1.25 oz)     Cautions regarding nipple shield use:  The use of a nipple shield may decrease your milk supply and could decrease the amount of milk your baby receives.  Careful follow-up, including weight checks, with your lactation consultant and baby’s health care provider is important.   Do not use a different nipple shield.     Before feeding your baby:  Apply warm, moist heat to your breasts for a few minutes to increase milk flow.  Rinse the shield in warm water to make it softer and easier to adhere to the breast.  Apply nipple shield correctly:               Hold the shield by the rim, turn it inside-out FCI. Place the shield, centered over the                 nipple and flip the shield right side out, drawing your nipple and areola into the shield as much as possible.  Massage breasts and if possible, hand express  some breastmilk into the nipple shield.     Latching your baby on to the breast:  Stroke your baby’s lower lip with the nipple of the shield. Wait for your baby to open wide like a “yawn,” with the tongue down and out over the lower lip. Bring  baby’s mouth directly over the shield. It may take a few attempts before your baby settles into a rhythmical suckling pattern.  After several minutes of regular swallowing at the breast, you may want to try to reattach your baby to your breast without the shield.  When your baby’s swallowing slows on the first side, repeat this process on the other breast.   If needed, trickle drops of your expressed milk or formula onto the nipple to encourage your baby to latch on. If your baby becomes upset or has not latched on within 20 minutes, stop and feed your baby using another method.    Signs of correct, effective suckling include:  Your baby swallows with nearly every suck (this is called nutritive suckling: swallowing every 1-3 sucks)  Your baby sustains nutritive suck and swallow pattern for at least 15-30 minutes, offer both breasts at each feeding.  Your nipples are not painful during the feeding. Your baby is content after most feedings.  Your baby has adequate diapers (at least 6-8 wet and 3-4 loose, yellow stools every 24 hours by the 4th day of life) AND gains at least 4-8 ounces per week (one-half to one ounce per day). Use the breastfeeding journal to record your baby’s feedings and diapers.    Is a supplement needed?  If your baby does not latch on, or swallows less than 15-30 minutes at a feeding - swallowing after most sucks (at least every 1-3 sucks), feed your baby additional expressed breastmilk or formula by  wide base slow flow bottle with 1 to 2 +oz to satisfaction.                 After feeding your baby:  Pump your breasts for 10-15 minutes using a hospital grade rental breast pump, after most daytime feedings. This may help maintain adequate milk supply while using the shield. If your baby is not latching on yet, pump at least 8-12 times each 24 hours.  Wash the nipple shield in hot soapy water, rinse and air dry. The shield may be boiled.     Follow-up is VERY important!  Let your baby’s health  care provider know immediately if it is difficult for you to feed your baby or if the number of wet or stool diapers is inadequate.   Follow-up visits with your lactation consultant and baby’s health care provider are necessary when using the shield.   Your baby’s weight should be checked 1-2 times each week while using a nipple shield.

## 2024-02-19 ENCOUNTER — TELEPHONE (OUTPATIENT)
Facility: CLINIC | Age: 29
End: 2024-02-19

## 2024-02-19 NOTE — TELEPHONE ENCOUNTER
Breast Pump order was received from [facility].  Order form was placed in Dr. Martha Yee's bin for signature.

## 2024-03-19 ENCOUNTER — POSTPARTUM (OUTPATIENT)
Facility: CLINIC | Age: 29
End: 2024-03-19
Payer: MEDICAID

## 2024-03-19 VITALS
HEIGHT: 66 IN | BODY MASS INDEX: 29.15 KG/M2 | HEART RATE: 80 BPM | SYSTOLIC BLOOD PRESSURE: 110 MMHG | WEIGHT: 181.38 LBS | DIASTOLIC BLOOD PRESSURE: 70 MMHG

## 2024-03-19 DIAGNOSIS — Z30.42 ENCOUNTER FOR DEPO-PROVERA CONTRACEPTION: ICD-10-CM

## 2024-03-19 PROCEDURE — 96372 THER/PROPH/DIAG INJ SC/IM: CPT

## 2024-03-19 RX ORDER — MEDROXYPROGESTERONE ACETATE 150 MG/ML
150 INJECTION, SUSPENSION INTRAMUSCULAR ONCE
Status: COMPLETED | OUTPATIENT
Start: 2024-03-19 | End: 2024-03-19

## 2024-03-19 RX ADMIN — MEDROXYPROGESTERONE ACETATE 150 MG: 150 INJECTION, SUSPENSION INTRAMUSCULAR at 14:32:00

## 2024-03-19 NOTE — PROGRESS NOTES
HPI    Mary Ortiz is a 28 year old female  here for 6 week post-partum visit.  Patient delivered a  male infant on 2/3/24 via .  Patient desires Depo for contraception.  Patient is breast feeding.   Patient denies symptoms of depression, [place]  depression scale score of 0 out of 30.     [place]  DEPRESSION SCALE  I have been able to laugh and see the funny side of things: As much as I always could  I have looked forward with enjoyment to things: As much as I ever did  I have been anxious or worried for no good reason: No, not at all  I have felt scared or panicky for no good reason: No, not at all  Things have been getting on top of me: No, I have been coping as well as ever  I have been so unhappy that I have had difficulty sleeping: Not at all  I have felt sad or miserable: No, not at all  I have been so unhappy that I have been crying: No, never  The thought of harming myself has occurred to me: Never  Total: 0    OBSTETRIC HISTORY  OB History    Para Term  AB Living   2 2 2     2   SAB IAB Ectopic Multiple Live Births         0 2      # Outcome Date GA Lbr Gurpreet/2nd Weight Sex Delivery Anes PTL Lv   2 Term 24 40w3d 03:02 / 00:28 8 lb 3 oz (3.714 kg) M NORMAL SPONT EPI N URSZULA   1 Term 03/15/22 38w3d 33:50 / 01:14 7 lb 13.2 oz (3.55 kg) M Vag-Spont EPI N URSZULA      Obstetric Comments   3/15/22 - denies hypertension/pre-eclampsia, GDM, birth complications. +COVID-19 during pregnancy. Placental path: Maternal vascular malperfusion, low-grade.       GYNE HISTORY        MEDICATIONS:    Current Outpatient Medications:     Prenatal MV & Min w/FA-DHA ([facility] OR), Take 1 tablet by mouth daily., Disp: , Rfl:     ibuprofen 600 MG Oral Tab, Take 1 tablet (600 mg total) by mouth every 6 (six) hours. (Patient not taking: Reported on 3/19/2024), Disp: 40 tablet, Rfl: 0    ALLERGIES:    Allergies   Allergen Reactions    Amoxicillin ANAPHYLAXIS and HIVES     Haloperidol Tightness in Throat     agitation    Penicillins ANAPHYLAXIS       PHYSICAL EXAM  Blood pressure 110/70, pulse 80, height 66\", weight 181 lb 6.4 oz (82.3 kg), last menstrual period 02/25/2024, currently breastfeeding.  General:  Well nourished, well developed woman in no acute distress  Abdomen:  soft, nontender, no masses  External Genitalia: normal appearance, hair distribution, and no lesions  Vagina:  Normal appearance without lesions, no abnormal discharge  Cervix:  Normal without tenderness on motion  Uterus: normal in size, contour, position, mobility, without tenderness  Adnexa: normal without masses or tenderness  Perineum: well healed perineum  Anus: no hemorroids       ASSESSMENT/PLAN  Mary was seen today for postpartum care, contraception and other.    Diagnoses and all orders for this visit:    Postpartum exam (HCC)    Encounter for Depo-Provera contraception  -     medroxyPROGESTERone (Depo-Provera) 150 MG/ML injection 150 mg      Discussed all options of birthcontrol including ocps, minipill, Mirena or Paragard IUD, nuvaring, orthoevra patch, nexplanon, Depoprovera, condoms or tubal sterilization options. Patient has chosen Depo-provera.  Patient to return for annual gyne exam in September.

## 2024-06-19 ENCOUNTER — NURSE ONLY (OUTPATIENT)
Facility: CLINIC | Age: 29
End: 2024-06-19

## 2024-06-19 VITALS
BODY MASS INDEX: 27.99 KG/M2 | SYSTOLIC BLOOD PRESSURE: 110 MMHG | DIASTOLIC BLOOD PRESSURE: 72 MMHG | HEART RATE: 75 BPM | WEIGHT: 174.19 LBS | HEIGHT: 66 IN

## 2024-06-19 DIAGNOSIS — Z30.42 DEPO-PROVERA CONTRACEPTIVE STATUS: Primary | ICD-10-CM

## 2024-06-19 LAB
CONTROL LINE PRESENT WITH A CLEAR BACKGROUND (YES/NO): YES YES/NO
KIT LOT #: NORMAL NUMERIC
PREGNANCY TEST, URINE: NEGATIVE

## 2024-06-19 PROCEDURE — 96372 THER/PROPH/DIAG INJ SC/IM: CPT

## 2024-06-19 PROCEDURE — 81025 URINE PREGNANCY TEST: CPT

## 2024-06-19 RX ORDER — MEDROXYPROGESTERONE ACETATE 150 MG/ML
150 INJECTION, SUSPENSION INTRAMUSCULAR ONCE
Status: COMPLETED | OUTPATIENT
Start: 2024-06-19 | End: 2024-06-19

## 2024-06-19 RX ADMIN — MEDROXYPROGESTERONE ACETATE 150 MG: 150 INJECTION, SUSPENSION INTRAMUSCULAR at 10:24:00

## 2024-07-10 ENCOUNTER — PATIENT MESSAGE (OUTPATIENT)
Facility: CLINIC | Age: 29
End: 2024-07-10

## 2024-07-10 NOTE — TELEPHONE ENCOUNTER
From: Mary Ortiz  To: Rae Guevara  Sent: 7/10/2024 8:21 AM CDT  Subject: Birth control/ Pregnancy Test    Hi! I am on the depo shot and my last day of unprotected sex was June 16. I was given a pregnancy test in office and it was negative and I also took an at home test yesterday and it was negative. However I am having symptoms of pregnancy..morning sickness especially. What do you think?

## 2025-05-23 ENCOUNTER — APPOINTMENT (OUTPATIENT)
Dept: GENERAL RADIOLOGY | Age: 30
End: 2025-05-23
Attending: EMERGENCY MEDICINE
Payer: MEDICAID

## 2025-05-23 ENCOUNTER — HOSPITAL ENCOUNTER (EMERGENCY)
Age: 30
Discharge: HOME | End: 2025-05-23
Attending: EMERGENCY MEDICINE
Payer: MEDICAID

## 2025-05-23 VITALS
DIASTOLIC BLOOD PRESSURE: 67 MMHG | HEART RATE: 77 BPM | RESPIRATION RATE: 18 BRPM | BODY MASS INDEX: 24.11 KG/M2 | SYSTOLIC BLOOD PRESSURE: 110 MMHG | OXYGEN SATURATION: 98 % | HEIGHT: 66 IN | TEMPERATURE: 98 F | WEIGHT: 150 LBS

## 2025-05-23 DIAGNOSIS — B34.9 VIRAL SYNDROME: Primary | ICD-10-CM

## 2025-05-23 LAB
POCT INFLUENZA A: NEGATIVE
POCT INFLUENZA B: NEGATIVE
SARS-COV-2 RNA RESP QL NAA+PROBE: NOT DETECTED

## 2025-05-23 PROCEDURE — 87502 INFLUENZA DNA AMP PROBE: CPT | Performed by: EMERGENCY MEDICINE

## 2025-05-23 PROCEDURE — 99283 EMERGENCY DEPT VISIT LOW MDM: CPT

## 2025-05-23 PROCEDURE — 99285 EMERGENCY DEPT VISIT HI MDM: CPT

## 2025-05-23 PROCEDURE — 87430 STREP A AG IA: CPT | Performed by: EMERGENCY MEDICINE

## 2025-05-23 PROCEDURE — 71045 X-RAY EXAM CHEST 1 VIEW: CPT | Performed by: EMERGENCY MEDICINE

## 2025-05-23 RX ORDER — PSEUDOEPHEDRINE HCL 30 MG/1
60 TABLET, FILM COATED ORAL EVERY 6 HOURS PRN
Qty: 36 TABLET | Refills: 0 | Status: SHIPPED | OUTPATIENT
Start: 2025-05-23 | End: 2025-06-22

## 2025-05-23 NOTE — ED PROVIDER NOTES
Patient Seen in: [facility] In [place]      History     Chief Complaint   Patient presents with    Cough/URI    Sore Throat     Stated Complaint: cough, sore throat    Subjective:     HPI    29-year-old healthy woman who reports returning from [place] on Tuesday, May 20, 2025, and experiencing cough and congestion shortly thereafter. A friend who accompanied the patient had strep throat, leading the patient to suspect the same condition. The patient took leftover azithromycin (Z-Ever) from their mother, which alleviated the sore throat but did not resolve the cough. The patient describes a severe cough and chest pain, particularly when coughing or taking deep breaths. Additionally, the patient has experienced body aches. The patient has two children, aged 3 and 8.    Objective:   Past Medical History:    Acute drug intoxication with delirium (HCC)    history of substance abuse. She was found unresponsive in a parking lot and was taken to a nearby motel. She received 4 mg Narcan intranasal by EMS and almost immediately became combative. Ammonia elevated at 82. CT head neg. 6/12/2020 UDS +amphetamine, +cocaine metabolite, +opiates, +cannabinoid. Was given Haldol. Left AMA    Acute pancreatitis (HCC)    Chronic constipation    BM every 2 days prior to 2023-24 pregnancy    Chronic hepatitis C (HCC)    Hepatitis C test positive    Formatting of this note is different from the original. Referral placed for GI. 1) Plan from GI: f/u 3 months PP, 6 months PP with Dr. Daniels 2) [facility] recommends consult:  Recommendations   1. Every 4 week growth scans:    -1/18:  32w2d EFW: 1751g Growth 60%   -2/15: 35w2d, Growth 52%, 2773g    -3/22:    2. Weekly NSTs beginning at 36 weeks gestation:   3. Hepatology consultation: 1/31: attended with    Heroin abuse (HCC)    Injected heroin for 2013-8/27/2019    History of intravenous drug use in remission    heroin IV 2013-8/27/2019    History of tobacco use    Irregular  menses    never really predictable, more irregular after recovering from IV heroin use.    Pap smear for cervical cancer screening    Pap negative - Jennifer Grant, NORMN-SHELLIE. No h/o abn pap              History reviewed. No pertinent surgical history.             Social History     Socioeconomic History    Marital status: Single   Tobacco Use    Smoking status: Former     Current packs/day: 0.00     Types: Cigarettes     Quit date: 8/13/2021     Years since quitting: 3.7    Smokeless tobacco: Never   Vaping Use    Vaping status: Never Used   Substance and Sexual Activity    Alcohol use: Not Currently    Drug use: Not Currently     Types: IV, Heroin     Comment: Quit heroin 8/27/2020    Sexual activity: Yes     Partners: Male     Birth control/protection: Depo Provera     Social Drivers of Health     Food Insecurity: No Food Insecurity (2/2/2024)    Food Insecurity     Food Insecurity: Never true   Transportation Needs: No Transportation Needs (2/2/2024)    Transportation Needs     Lack of Transportation: No   Housing Stability: Low Risk  (2/2/2024)    Housing Stability     Housing Instability: No              Review of Systems    Positive for stated complaint: cough, sore throat  Other systems are as noted in HPI.  Constitutional and vital signs reviewed.      All other systems reviewed and negative except as noted above.    Physical Exam     ED Triage Vitals [05/23/25 1149]   /67   Pulse 77   Resp 18   Temp 97.7 °F (36.5 °C)   Temp src    SpO2 98 %   O2 Device None (Room air)       Current:/67   Pulse 77   Temp 97.7 °F (36.5 °C)   Resp 18   Ht 167.6 cm (5' 6\")   Wt 68 kg   LMP 04/21/2024 (Approximate)   SpO2 98%   BMI 24.21 kg/m²     General:  Vitals as listed.  No acute distress   HEENT: Sclerae anicteric.  Conjunctivae show no pallor.  Oropharynx clear, mucous membranes moist.  Nasal congestion  Lungs: good air exchange and clear without wheezing  Heart: regular rate rhythm and no murmur    Extremities: no edema, normal peripheral pulses.  No calf tenderness or lower extremity symmetry  Neuro: Alert oriented and nonfocal      ED Course     Labs Reviewed   RAPID STREP A SCREEN (LC) - Normal    Narrative:     A confirmatory culture is recommended if clinically indicated.   RAPID SARS-COV-2 BY PCR - Normal   POCT FLU TEST - Normal    Narrative:     This assay is a rapid molecular in vitro test utilizing nucleic acid amplification of influenza A and B viral RNA.     XR CHEST AP PORTABLE  (CPT=71045)  Result Date: 5/23/2025  CONCLUSION:  There is no evidence of active cardiopulmonary disease on this single portable chest radiograph.   LOCATION:  [facility]      Dictated by (CST): Luís Enriquez MD on 5/23/2025 at 12:52 PM     Finalized by (CST): Luís Enriquez MD on 5/23/2025 at 12:52 PM       I independently read the radiographs and no consolidation seen on chest x-ray    ED COURSE and MDM       Differential diagnosis before testing included viral pneumonitis versus pulmonary embolism, a medical condition that poses a threat to life.    I reviewed prior external notes including OB postpartum visit on 3/19/2024    Patient use Tylenol/ibuprofen and pseudoephedrine for symptomatic relief    I have discussed with the patient the results of testing, differential diagnosis, and treatment plan. They expressed clear understanding of these instructions and agrees to the plan provided.    Disposition and Plan     Clinical Impression:  1. Viral syndrome         Disposition:  Discharge  5/23/2025  1:40 pm    Follow-up:  Christian Rees MD  [address] [address]  [phone]    Follow up in 1 week(s)  As needed, ... or your usual primary care doctor        Medications Prescribed:  Discharge Medication List as of 5/23/2025  1:45 PM        START taking these medications    Details   pseudoephedrine 30 MG Oral Tab Take 2 tablets (60 mg total) by mouth every 6 (six) hours as needed for congestion.,  Normal, Disp-36 tablet, R-0

## 2025-05-23 NOTE — DISCHARGE INSTRUCTIONS
Take 1000 mg acetaminophen (2 Tylenol tablets) and/or 600 mg ibuprofen (3 Advil tablets) every 6 hours as needed for pain/fever.    Can use Sudafed to control the congestion

## (undated) NOTE — LETTER
May 23, 2025    Patient: Mary SNOW Wait   Date of Visit: 5/23/2025       To Whom It May Concern:    Mary Ortiz was seen and treated in our emergency department on 5/23/2025. She should not return to work until 5/26/25.    If you have any questions or concerns, please don't hesitate to call.       Encounter Provider(s):    Micha Butler MD

## (undated) NOTE — LETTER
Date & Time: 8/30/2021, 9:10 PM  Patient: Kay Ortiz  Encounter Provider(s):    Jean Lee MD       To Whom It May Concern:    Kay Ortiz was seen and treated in our department on 8/30/2021.  She should not return to work until fever free for 24 h

## (undated) NOTE — LETTER
Date & Time: 11/5/2021, 3:31 PM  Patient: Owen Fee Wait  Encounter Provider(s):    MD Satya Farrell PA-C       To Whom It May Concern:    Mary Ortiz was seen and treated in our department on 11/5/2021.  She should not return to work un

## (undated) NOTE — LETTER
[facility] EMERGENCY DEPARTMENT IN [facility]  [phone]     Patient: Yunior Ortiz   YOB: 1995   Date of Visit: 8/30/2021     Dear Employer,        August 30, 2021    At [facility] [employer], we ar symptoms may discontinue isolation and other precautions 10 days after the date of their first positive RT-PCR test for SARS-CoV-2 RNA.     Persons who are asymptomatic but have been exposed, CDC recommends 14 days of quarantine after exposure based on the

## (undated) NOTE — LETTER
Date & Time: 12/7/2022, 4:13 PM  Patient: Ward Ortiz  Encounter Provider(s):    ARJUN Santos       To Whom It May Concern:    Ward Ortiz was seen and treated in our department on 12/7/2022. She should not return to work until 12/8/22.  .    If you have any questions or concerns, please do not hesitate to call.        _____________________________  Physician/APC Signature

## (undated) NOTE — LETTER
Date & Time: 5/16/2021, 5:49 PM  Patient: Kay Ortiz  Encounter Provider(s):    Ulyses Meigs, MD       To Whom It May Concern:    Kay rOtiz was seen and treated in our department on 5/16/2021. She should be excused from work today.  If feeling ill, sh

## (undated) NOTE — LETTER
Date & Time: 9/28/2021, 7:51 PM  Patient: Valencia Fan  Encounter Provider(s):    Gonzalo Oropeza DO       To Whom It May Concern:    Nayely Peterson Angel was seen and treated in our department on 9/28/2021. She should not return to work until 9/30/21.     If you

## (undated) NOTE — LETTER
Date & Time: 11/25/2021, 11:18 PM  Patient: Anastacia Jeancarlos Wait  Encounter Provider(s): Kell Ibarra MD       To Whom It May Concern:    Anastacia Ortiz was seen and treated in our department on 11/25/2021.  She had a negative rapid covid test on 11/25/21 and may r

## (undated) NOTE — ED AVS SNAPSHOT
Blair Marroquin   MRN: [id number]    Department:  THE [facility] in [place]   Date of Visit:  11/7/2017           Disclosure     Insurance plans vary and the physician(s) referred by the ER may not be covered by your plan.  Please contact yo If you have been prescribed any medication(s), please fill your prescription right away and begin taking the medication(s) as directed    If the emergency physician has read X-rays, these will be re-interpreted by a radiologist.  If there is a significant